# Patient Record
Sex: FEMALE | Race: WHITE | NOT HISPANIC OR LATINO | Employment: OTHER | URBAN - METROPOLITAN AREA
[De-identification: names, ages, dates, MRNs, and addresses within clinical notes are randomized per-mention and may not be internally consistent; named-entity substitution may affect disease eponyms.]

---

## 2017-09-20 ENCOUNTER — OFFICE VISIT (OUTPATIENT)
Dept: RETAIL CLINIC | Facility: CLINIC | Age: 47
End: 2017-09-20

## 2017-09-20 ENCOUNTER — RESULTS ENCOUNTER (OUTPATIENT)
Dept: RETAIL CLINIC | Facility: CLINIC | Age: 47
End: 2017-09-20

## 2017-09-20 VITALS
TEMPERATURE: 98.3 F | RESPIRATION RATE: 16 BRPM | BODY MASS INDEX: 26.22 KG/M2 | SYSTOLIC BLOOD PRESSURE: 140 MMHG | HEIGHT: 63 IN | OXYGEN SATURATION: 96 % | HEART RATE: 95 BPM | WEIGHT: 148 LBS | DIASTOLIC BLOOD PRESSURE: 88 MMHG

## 2017-09-20 DIAGNOSIS — N39.0 URINARY TRACT INFECTION, SITE UNSPECIFIED: Primary | ICD-10-CM

## 2017-09-20 DIAGNOSIS — N39.0 URINARY TRACT INFECTION, SITE UNSPECIFIED: ICD-10-CM

## 2017-09-20 PROCEDURE — 99202 OFFICE O/P NEW SF 15 MIN: CPT | Performed by: NURSE PRACTITIONER

## 2017-09-20 RX ORDER — NITROFURANTOIN 25; 75 MG/1; MG/1
100 CAPSULE ORAL 2 TIMES DAILY
Qty: 14 CAPSULE | Refills: 0 | Status: SHIPPED | OUTPATIENT
Start: 2017-09-20 | End: 2019-04-15

## 2017-09-20 RX ORDER — LEVOTHYROXINE SODIUM 0.1 MG/1
100 TABLET ORAL DAILY
COMMUNITY
End: 2019-04-15

## 2017-09-20 NOTE — PATIENT INSTRUCTIONS
Urinary Tract Infection, Adult  A urinary tract infection (UTI) is an infection of any part of the urinary tract, which includes the kidneys, ureters, bladder, and urethra. These organs make, store, and get rid of urine in the body. UTI can be a bladder infection (cystitis) or kidney infection (pyelonephritis).  CAUSES  This infection may be caused by fungi, viruses, or bacteria. Bacteria are the most common cause of UTIs. This condition can also be caused by repeated incomplete emptying of the bladder during urination.   RISK FACTORS  This condition is more likely to develop if:   · You ignore your need to urinate or hold urine for long periods of time.  · You do not empty your bladder completely during urination.  · You wipe back to front after urinating or having a bowel movement, if you are female.  · You are uncircumcised, if you are male.    · You are constipated.  · You have a urinary catheter that stays in place (indwelling).  · You have a weak defense (immune) system.  · You have a medical condition that affects your bowels, kidneys, or bladder.  · You have diabetes.  · You take antibiotic medicines frequently or for long periods of time, and the antibiotics no longer work well against certain types of infections (antibiotic resistance).  · You take medicines that irritate your urinary tract.  · You are exposed to chemicals that irritate your urinary tract.  · You are female.  SYMPTOMS   Symptoms of this condition include:   · Fever.    · Frequent urination or passing small amounts of urine frequently.    · Needing to urinate urgently.    · Pain or burning with urination.    · Urine that smells bad or unusual.    · Cloudy urine.    · Pain in the lower abdomen or back.    · Trouble urinating.    · Blood in the urine.    · Vomiting or being less hungry than normal.     · Diarrhea or abdominal pain.    · Vaginal discharge, if you are female.  DIAGNOSIS  This condition is diagnosed with a medical history and  physical exam. You will also need to provide a urine sample to test your urine. Other tests may be done, including:    · Blood tests.    · Sexually transmitted disease (STD) testing.     If you have had more than one UTI, a cystoscopy or imaging studies may be done to determine the cause of the infections.   TREATMENT   Treatment for this condition often includes a combination of two or more of the following:   · Antibiotic medicine.    · Other medicines to treat less common causes of UTI.    · Over-the-counter medicines to treat pain.    · Drinking enough water to stay hydrated.  HOME CARE INSTRUCTIONS  · Take over-the-counter and prescription medicines only as told by your health care provider.  · If you were prescribed an antibiotic, take it as told by your health care provider. Do not stop taking the antibiotic even if you start to feel better.  · Avoid alcohol, caffeine, tea, and carbonated beverages. They can irritate your bladder.  · Drink enough fluid to keep your urine clear or pale yellow.  · Keep all follow-up visits as told by your health care provider. This is important.  · Make sure to:    Empty your bladder often and completely. Do not hold urine for long periods of time.    Empty your bladder before and after sex.    Wipe from front to back after a bowel movement if you are female. Use each tissue one time when you wipe.  SEEK MEDICAL CARE IF:   · You have back pain.    · You have a fever.  · You feel nauseous or vomit.    · Your symptoms do not get better after 3 days.     · Your symptoms go away and then return.  SEEK IMMEDIATE MEDICAL CARE IF:   · You have severe back pain or lower abdominal pain.    · You are vomiting and cannot keep down any medicines or water.     This information is not intended to replace advice given to you by your health care provider. Make sure you discuss any questions you have with your health care provider.     Document Released: 09/27/2006 Document Revised: 04/10/2017  Document Reviewed: 11/07/2016  [employer] Interactive Patient Education ©2017 [employer]    Drink plenty of decaffeinated fluids  Continue pyridium x 48 hours  Take antibiotic as prescribed, even if you begin to feel better  See PCP if no improvement in 48 hours, you get worse or you develop new symptoms  Report adverse side effects to new medications

## 2017-09-20 NOTE — PROGRESS NOTES
Subjective   Urinary Tract Infection    Cassia Ochoa is a 47 y.o. female who complains of urinary frequency, urgency and dysuria x 7 days. She has been taking pyridium and Aleve with minimal relief.      Urinary Tract Infection    This is a new problem. The current episode started in the past 7 days. The problem occurs every urination. The problem has been gradually worsening. The quality of the pain is described as stabbing. The pain is at a severity of 6/10. There has been no fever. She is sexually active. There is no history of pyelonephritis. Associated symptoms include frequency, nausea and urgency. Pertinent negatives include no flank pain or hematuria. She has tried home medications and NSAIDs for the symptoms. The treatment provided mild relief.        History Obtained from: Patient    Past Medical History:   Diagnosis Date   • Hashimoto's thyroiditis    • Urinary tract infection      Past Surgical History:   Procedure Laterality Date   • HYSTERECTOMY       Social History     Social History   • Marital status: Single     Spouse name: N/A   • Number of children: N/A   • Years of education: N/A     Occupational History   • Not on file.     Social History Main Topics   • Smoking status: Former Smoker     Quit date: 2016   • Smokeless tobacco: Never Used   • Alcohol use Yes      Comment: rare   • Drug use: No   • Sexual activity: Defer     Other Topics Concern   • Not on file     Social History Narrative   • No narrative on file     History reviewed. No pertinent family history.  Allergies   Allergen Reactions   • Sulfa Antibiotics Anaphylaxis   • Codeine      Current Outpatient Prescriptions   Medication Sig Dispense Refill   • ESTRADIOL PO Take  by mouth.     • levothyroxine (SYNTHROID, LEVOTHROID) 100 MCG tablet Take 100 mcg by mouth Daily.     • nitrofurantoin, macrocrystal-monohydrate, (MACROBID) 100 MG capsule Take 1 capsule by mouth 2 (Two) Times a Day. 14 capsule 0     No current  "facility-administered medications for this visit.         Review of Systems   Gastrointestinal: Positive for nausea.   Genitourinary: Positive for frequency and urgency. Negative for flank pain and hematuria.       Objective     VITAL SIGNS:   Vitals:    09/20/17 1427   BP: 140/88   Pulse: 95   Resp: 16   Temp: 98.3 °F (36.8 °C)   TempSrc: Temporal Artery    SpO2: 96%   Weight: 148 lb (67.1 kg)   Height: 63\" (160 cm)   PainSc:   6   Body mass index is 26.22 kg/(m^2).    Physical Exam   Constitutional: She appears well-developed and well-nourished.   HENT:   Head: Normocephalic and atraumatic.   Right Ear: External ear normal.   Left Ear: External ear normal.   Mouth/Throat: Mucous membranes are normal.   Eyes: Lids are normal. Pupils are equal, round, and reactive to light. No scleral icterus.   Neck: Phonation normal. Neck supple.   Cardiovascular: Normal rate and regular rhythm.    Pulmonary/Chest: Effort normal and breath sounds normal.   Abdominal: Soft. Bowel sounds are normal. There is no hepatosplenomegaly. There is tenderness in the suprapubic area. There is no CVA tenderness.   Musculoskeletal: She exhibits no edema or deformity.   Lymphadenopathy:     She has no cervical adenopathy.        Right: No supraclavicular adenopathy present.        Left: No supraclavicular adenopathy present.   Neurological: She is alert. She has normal strength. She is not disoriented.   Skin: Skin is warm and dry. No rash noted. No cyanosis.   Psychiatric: She has a normal mood and affect. She is attentive.       LABS: No results found for this or any previous visit.    CLINICAL QUALITY MEASURES:  Tobacco Screening & Intervention Screened & identified as tobacco non-user. Former smoker   WEIGHT SCREENING/BMI  Not eligible, overweight & managed by other physician     Assessment/Plan   Frequency of urination  -     Cancel: Urine culture (clean catch); Future  -     Urine culture (clean catch); Future    Other orders  -     " nitrofurantoin, macrocrystal-monohydrate, (MACROBID) 100 MG capsule; Take 1 capsule by mouth 2 (Two) Times a Day.      PLAN:    Defer dipstick UA due to pyridium use  Await culture results  Patient should follow up with primary care provider (list provided to patient) if symptoms worsen, fail to resolve or other symptoms need attention.      The patient voiced understanding and agreement to the patient treatment plan and instructions     JEAN Bello

## 2017-09-28 ENCOUNTER — TELEPHONE (OUTPATIENT)
Dept: RETAIL CLINIC | Facility: CLINIC | Age: 47
End: 2017-09-28

## 2017-09-28 NOTE — TELEPHONE ENCOUNTER
Phoned pt regarding urinalysis culture results she has inquired about.  Informed pt that the specimen had not been picked up.  Return to clinic or primary care provider to follow up if symptoms return.  Pt verbalized understanding.

## 2019-04-15 ENCOUNTER — OFFICE VISIT (OUTPATIENT)
Dept: INTERNAL MEDICINE | Facility: CLINIC | Age: 49
End: 2019-04-15

## 2019-04-15 VITALS
TEMPERATURE: 98.4 F | WEIGHT: 160 LBS | DIASTOLIC BLOOD PRESSURE: 78 MMHG | BODY MASS INDEX: 27.31 KG/M2 | OXYGEN SATURATION: 96 % | HEIGHT: 64 IN | HEART RATE: 69 BPM | SYSTOLIC BLOOD PRESSURE: 118 MMHG

## 2019-04-15 DIAGNOSIS — Z83.3 FAMILY HISTORY OF DIABETES MELLITUS: ICD-10-CM

## 2019-04-15 DIAGNOSIS — E06.3 HYPOTHYROIDISM DUE TO HASHIMOTO'S THYROIDITIS: Primary | ICD-10-CM

## 2019-04-15 DIAGNOSIS — M25.50 ARTHRALGIA, UNSPECIFIED JOINT: ICD-10-CM

## 2019-04-15 DIAGNOSIS — E03.8 HYPOTHYROIDISM DUE TO HASHIMOTO'S THYROIDITIS: Primary | ICD-10-CM

## 2019-04-15 LAB
HBA1C MFR BLD: 5.2 %
T4 FREE SERPL-MCNC: 0.29 NG/DL (ref 0.93–1.7)
TSH SERPL DL<=0.05 MIU/L-ACNC: >100 MIU/ML (ref 0.27–4.2)

## 2019-04-15 PROCEDURE — 83036 HEMOGLOBIN GLYCOSYLATED A1C: CPT | Performed by: NURSE PRACTITIONER

## 2019-04-15 PROCEDURE — 84439 ASSAY OF FREE THYROXINE: CPT | Performed by: NURSE PRACTITIONER

## 2019-04-15 PROCEDURE — 84443 ASSAY THYROID STIM HORMONE: CPT | Performed by: NURSE PRACTITIONER

## 2019-04-15 PROCEDURE — 99203 OFFICE O/P NEW LOW 30 MIN: CPT | Performed by: NURSE PRACTITIONER

## 2019-04-15 RX ORDER — FUROSEMIDE 20 MG/1
20 TABLET ORAL 2 TIMES DAILY
COMMUNITY
End: 2020-07-10 | Stop reason: SDUPTHER

## 2019-04-15 NOTE — PATIENT INSTRUCTIONS
"Gluten-Free Diet for Celiac Disease, Adult  The gluten-free diet includes all foods that do not contain gluten. Gluten is a protein that is found in wheat, rye, barley, and some other grains. Following the gluten-free diet is the only treatment for people with celiac disease. It helps to prevent damage to the intestines and improves or eliminates the symptoms of celiac disease.  Following the gluten-free diet requires some planning. It can be challenging at first, but it gets easier with time and practice. There are more gluten-free options available today than ever before. If you need help finding gluten-free foods or if you have questions, talk with your diet and nutrition specialist (registered dietitian) or your health care provider.  What do I need to know about a gluten-free diet?  · All fruits, vegetables, and meats are safe to eat and do not contain gluten.  · When grocery shopping, start by shopping in the produce, meat, and dairy sections. These sections are more likely to contain gluten-free foods. Then move to the aisles that contain packaged foods if you need to.  · Read all food labels. Gluten is often added to foods. Always check the ingredient list and look for warnings, such as “may contain gluten.\"  · Talk with your dietitian or health care provider before taking a gluten-free multivitamin or mineral supplement.  · Be aware of gluten-free foods having contact with foods that contain gluten (cross-contamination). This can happen at home and with any processed foods.  ? Talk with your health care provider or dietitian about how to reduce the risk of cross-contamination in your home.  ? If you have questions about how a food is processed, ask the .  What key words help to identify gluten?  Foods that list any of these key words on the label usually contain gluten:  · Wheat, flour, enriched flour, bromated flour, white flour, durum flour, anabel flour, phosphated flour, self-rising flour, " semolina, farina, barley (malt), rye, and oats.  · Starch, dextrin, modified food starch, or cereal.  · Thickening, fillers, or emulsifiers.  · Malt flavoring, malt extract, or malt syrup.  · Hydrolyzed vegetable protein.    In the [place] packaged foods that are gluten-free are required to be labeled “GF.” These foods should be easy to identify and are safe to eat. In the [place] food companies are also required to list common food allergens, including wheat, on their labels.  Recommended foods  Grains  · Amaranth, bean flours, 100% buckwheat flour, corn, millet, nut flours or nut meals, GF oats, quinoa, rice, sorghum, teff, rice wafers, pure cornmeal tortillas, popcorn, and hot cereals made from cornmeal. Berkley, rice, wild rice. Some Asian rice noodles or bean noodles. Arrowroot starch, corn bran, corn flour, corn germ, cornmeal, corn starch, potato flour, potato starch flour, and rice bran. Plain, brown, and sweet rice flours. Rice polish, soy flour, and tapioca starch.  Vegetables  · All plain fresh, frozen, and canned vegetables.  Fruits  · All plain fresh, frozen, canned, and dried fruits, and 100% fruit juices.  Meats and other protein foods  · All fresh beef, pork, poultry, fish, seafood, and eggs. Fish canned in water, oil, brine, or vegetable broth. Plain nuts and seeds, peanut butter. Some lunch meat and some frankfurters. Dried beans, dried peas, and lentils.  Dairy  · Fresh plain, dry, evaporated, or condensed milk. Cream, butter, sour cream, whipping cream, and most yogurts. Unprocessed cheese, most processed cheeses, some cottage cheese, some cream cheeses.  Beverages  · Coffee, tea, most herbal teas. Carbonated beverages and some root beers. Wine, sake, and distilled spirits, such as gin, vodka, and whiskey. Most hard ciders.  Fats and oils  · Butter, margarine, vegetable oil, hydrogenated butter, olive oil, shortening, lard, cream, and some mayonnaise. Some commercial salad dressings. Olives.  Sweets  and desserts  · Sugar, honey, some syrups, molasses, jelly, and jam. Plain hard candy, marshmallows, and gumdrops. Pure cocoa powder. Plain chocolate. Custard and some pudding mixes. Gelatin desserts, sorbets, frozen ice pops, and sherbet. Cake, cookies, and other desserts prepared with allowed flours. Some commercial ice creams. Cornstarch, tapioca, and rice puddings.  Seasoning and other foods  · Some canned or frozen soups. Monosodium glutamate (MSG). Cider, rice, and wine vinegar. Baking soda and baking powder. Cream of tartar. Baking and nutritional yeast. Certain soy sauces made without wheat (ask your dietitian about specific brands that are allowed). Nuts, coconut, and chocolate. Salt, pepper, herbs, spices, flavoring extracts, imitation or artificial flavorings, natural flavorings, and food colorings. Some medicines and supplements. Some lip glosses and other cosmetics. Rice syrups.  The items listed may not be a complete list. Talk with your dietitian about what dietary choices are best for you.  Foods to avoid  Grains  · Barley, bran, bulgur, couscous, cracked wheat, Perez, farro, anabel, malt, matzo, semolina, wheat germ, and all wheat and rye cereals including spelt and kamut. Cereals containing malt as a flavoring, such as rice cereal. Noodles, spaghetti, macaroni, most packaged rice mixes, and all mixes containing wheat, rye, barley, or triticale.  Vegetables  · Most creamed vegetables and most vegetables canned in sauces. Some commercially prepared vegetables and salads.  Fruits  · Thickened or prepared fruits and some pie fillings. Some fruit snacks and fruit roll-ups.  Meats and other protein foods  · Any meat or meat alternative containing wheat, rye, barley, or gluten stabilizers. These are often marinated or packaged meats and lunch meats. Bread-containing products, such as Swiss steak, croquettes, meatballs, and meatloaf. Most tuna canned in vegetable broth and turkey with hydrolyzed vegetable  protein (HVP) injected as part of the basting. Seitan. Imitation fish. Eggs in sauces made from ingredients to avoid.  Dairy  · Commercial chocolate milk drinks and malted milk. Some non-dairy creamers. Any cheese product containing ingredients to avoid.  Beverages  · Certain cereal beverages. Beer, marion, malted milk, and some root beers. Some hard ciders. Some instant flavored coffees. Some herbal teas made with barley or with barley malt added.  Fats and oils  · Some commercial salad dressings. Sour cream containing modified food starch.  Sweets and desserts  · Some toffees. Chocolate-coated nuts (may be rolled in wheat flour) and some commercial candies and candy bars. Most cakes, cookies, donuts, pastries, and other baked goods. Some commercial ice cream. Ice cream cones. Commercially prepared mixes for cakes, cookies, and other desserts. Bread pudding and other puddings thickened with flour. Products containing brown rice syrup made with barley malt enzyme. Desserts and sweets made with malt flavoring.  Seasoning and other foods  · Some sumner powders, some dry seasoning mixes, some gravy extracts, some meat sauces, some ketchups, some prepared mustards, and horseradish. Certain soy sauces. Malt vinegar. Bouillon and bouillon cubes that contain HVP. Some chip dips, and some chewing gum. Yeast extract. Meyer’s yeast. Caramel color. Some medicines and supplements. Some lip glosses and other cosmetics.  The items listed may not be a complete list. Talk with your dietitian about what dietary choices are best for you.  Summary  · Gluten is a protein that is found in wheat, rye, barley, and some other grains. The gluten-free diet includes all foods that do not contain gluten.  · If you need help finding gluten-free foods or if you have questions, talk with your diet and nutrition specialist (registered dietitian) or your health care provider.  · Read all food labels. Gluten is often added to foods. Always check the  "ingredient list and look for warnings, such as “may contain gluten.\"  This information is not intended to replace advice given to you by your health care provider. Make sure you discuss any questions you have with your health care provider.  Document Released: 12/18/2006 Document Revised: 10/02/2017 Document Reviewed: 10/02/2017  [employer] Interactive Patient Education © 2019 [employer]  Low-Gluten Eating Plan  Gluten is a protein that is found in wheat, barley, rye, and some other grains. Some people have a condition that makes them unable to digest gluten. For those people, eating just a small amount of gluten can damage their intestines.  This is not a gluten-free eating plan. This low-gluten eating plan is for people who feel better when they eat less gluten.  What do I need to know about this eating plan?  · You can eat anything that does not contain wheat or other grains that have gluten.  · You can eat anything that is labeled \"gluten-free.\"  · Make sure to read food labels.  · Eat a variety of foods so you get all of the nutrients that you need.  · Avoid processed foods and sauces because many of them contain wheat. To have more control over the ingredients in your meals, consider making food yourself instead of buying prepared foods.  What foods can I eat?  Grains  Rice. Bulgur. Quinoa. Corn. Buckwheat. Amaranth. Corn tortillas or taco shells. Oatmeal that is labeled as “gluten free” or “uncontaminated.\"  Vegetables  Lettuce. Spinach. Peas. Beets. Cauliflower. Cabbage. Broccoli. Carrots. Tomatoes. Squash. Eggplant. Herbs. Peppers. Onions. Cucumbers. Shiro sprouts. Yams and sweet potatoes. Beans. Lentils.  Fruits  Bananas. Apples. Oranges. Grapes. Papaya. Camak. Pomegranate. Kiwi. Grapefruit. Cherries.  Meats and Other Protein Sources  Beef. Pork. Chicken. Turkey. Fish. Eggs. Tofu. Beans. Nuts. Lentils.  Dairy  Milk. Ice cream. Yogurt. Cheese. Cottage cheese.  Beverages  Water. Coffee. Tea. Juice. Soda. " Sawyer water.  Condiments  Mustard. Relish. Low-fat, low-sugar ketchup. Low-fat, low-sugar barbecue sauce. Vinegar. Low-fat or fat-free mayonnaise.  Sweets and Desserts  Honey. Sugar. Maple syrup.  Fats and Oils  Butter. Vegetable oil. Olive oil. Canola oil. Barrington oil.  Other  Arrowroot or cornstarch. Potato flour.  The items listed above may not be a complete list of recommended foods or beverages. Contact your dietitian for more options.  What foods are not recommended?  Grains  Wheat. Barley. Rye. Oatmeal.  Meat and Other Protein Sources  Seitan. Cold cuts. Hotdogs. Salami. Sausages.  Beverages  Beer.  Condiments  Malt vinegar. Salad dressing. Soy sauce.  Sweets and Desserts  Licorice. Brown rice syrup. Pre-made pudding or pudding mixes.  Other  Bouillon cubes. Canned or boxed pre-made soups or soup packets. Bagged chips, such as potato chips and tortilla chips. Seasoning packets.  The items listed above may not be a complete list of foods and beverages to avoid. Contact your dietitian for more information.  This information is not intended to replace advice given to you by your health care provider. Make sure you discuss any questions you have with your health care provider.  Document Released: 05/03/2016 Document Revised: 05/25/2017 Document Reviewed: 01/13/2016  [employer] Interactive Patient Education © 2019 [employer]

## 2019-04-15 NOTE — PROGRESS NOTES
Chief Complaint   Patient presents with   • Establish Care   • Thyroid Problem       History of Present Illness  48 y.o.female presents for [facility] new patient, follow-up thyroid disorder.  Patient with history of Hashimoto's disease but has been off her medicine since October as she lost her health insurance.  She is interested in resuming treatment but she still does not have health insurance so requests testing be limited at this time.  Has complaints of fatigue, weight gain.  Her hair had been falling out but this improved after she quit the taking the Synthroid.    Other than some past history of thyroid disease she does have some complaints of joint pain bilateral knee area; asking if this is related to her autoimmune disease.  Onset several months but worse over the last few weeks.  Her joint pain interferes with her daily activities.  She notices swelling at the joints intermittent and takes occasional Lasix.    Has complaints of excessive thirst and concerned that she may have diabetes as has a strong family history.  She is wanting diabetes testing today.    Has also noticed some possible reaction to bread and crackers with concern for gluten intolerance.  She develops abdominal pain bloating and belching with wheat products.  Has not had testing.      Review of Systems   Constitutional: Positive for fatigue and unexpected weight gain. Negative for appetite change, chills, diaphoresis and fever.   HENT: Negative.    Eyes: Negative for visual disturbance.   Respiratory: Negative for shortness of breath.    Cardiovascular: Positive for leg swelling. Negative for chest pain and palpitations.   Gastrointestinal: Positive for abdominal distention, abdominal pain and indigestion. Negative for blood in stool, constipation, diarrhea, nausea and vomiting.   Endocrine: Positive for heat intolerance and polydipsia. Negative for polyphagia and polyuria.   Genitourinary: Negative for difficulty urinating.  "  Musculoskeletal: Positive for arthralgias and joint swelling.   Skin: Negative for rash.   Psychiatric/Behavioral: Negative for depressed mood. The patient is nervous/anxious.          [facility]  The following portions of the patient's history were reviewed and updated as appropriate: allergies, current medications, past family history, past medical history, past social history, past surgical history and problem list.     Social hx:  Tobacco quit smoking 3 years ago  Alcohol maybe 1 drink per week.  Past Medical History:   Diagnosis Date   • Bilateral ovarian cysts    • Hashimoto's thyroiditis    • Hypothyroidism    • Urinary tract infection       Past Surgical History:   Procedure Laterality Date   • HYSTERECTOMY        Allergies   Allergen Reactions   • Sulfa Antibiotics Anaphylaxis   • Codeine       Family History   Problem Relation Age of Onset   • Hypertension Father    • Cancer Brother    • Thyroid disease Daughter             Current Outpatient Medications:   •  ESTRADIOL PO, Take  by mouth., Disp: , Rfl:   •  furosemide (LASIX) 20 MG tablet, Take 20 mg by mouth 2 (Two) Times a Day., Disp: , Rfl:     VITALS:  /78   Pulse 69   Temp 98.4 °F (36.9 °C)   Ht 162.6 cm (64\")   Wt 72.6 kg (160 lb)   SpO2 96%   Breastfeeding? No   BMI 27.46 kg/m²     Physical Exam   Constitutional: She is oriented to person, place, and time. She appears well-developed and well-nourished. No distress.   HENT:   Head: Normocephalic.   Right Ear: External ear normal.   Left Ear: External ear normal.   Nose: Nose normal.   Mouth/Throat: Oropharynx is clear and moist.   Eyes: EOM are normal. Pupils are equal, round, and reactive to light.   Neck: Normal range of motion. Neck supple. No thyromegaly present.   Cardiovascular: Normal rate, regular rhythm, normal heart sounds and intact distal pulses.   Pulmonary/Chest: Effort normal and breath sounds normal. No respiratory distress.   Abdominal: Soft. Bowel sounds are normal. There " is no tenderness.   Musculoskeletal:   Normal ROM all major joints; mild swelling bilateral knees.   Lymphadenopathy:     She has no cervical adenopathy.   Neurological: She is alert and oriented to person, place, and time.   Skin: Skin is warm and dry. Capillary refill takes less than 2 seconds. No rash noted.   Psychiatric: She has a normal mood and affect. Her behavior is normal.       LABS  Results for orders placed or performed in visit on 04/15/19   TSH   Result Value Ref Range    TSH >100.000 (H) 0.270 - 4.200 mIU/mL   T4, Free   Result Value Ref Range    Free T4 0.29 (L) 0.93 - 1.70 ng/dL   POC Glycosylated Hemoglobin (Hb A1C)   Result Value Ref Range    Hemoglobin A1C 5.2 %       ASSESSMENT/PLAN  Cassia was seen today for establish care and thyroid problem.    Diagnoses and all orders for this visit:    Hypothyroidism due to Hashimoto's thyroiditis  -     TSH  -     T4, Free  Discussed synthroid vs NP thyroid.  Pt did not like the alopecia that occurred with synthroid; she would like to try NP thyroid.  I provided pt with samples NP thyroid 30mg tab once daily then FU in 6 weeks.  Educated to take med in am and avoid taking with any other medications 4 hrs before and 4 hrs after.    Family history of diabetes mellitus  -     POC Glycosylated Hemoglobin (Hb A1C)    Arthralgia, unspecified joint  Comments:  pt declines joint pain work up labs    Patient denies further lab workup at this time other than diabetes screening and for her thyroid.  She does not have insurance she cannot afford further testing.  She did want me to give her a list of labs that I would recommend for her for further workup and she will check into cost and possibility of some financing.  I did explain to her that often times with one autoimmune process there are other autoimmune processes that can show up.  In regards to her bilateral knee pain joint swelling etc. would do workup to include rheumatoid arthritis panel JUNE and sed  rate.  She also at some point needs to get an updated CMP and CBC.  I will try to contact her prior primary care provider to see what kind of workup labs those available there.    I discussed the patients findings and my recommendations with patient.  Patient was encouraged to keep me informed of any acute changes, lack of improvement, or any new concerning symptoms.    Patient voiced understanding of all instructions and denied further questions.      FOLLOW-UP  Return in about 6 weeks (around 5/27/2019), or if symptoms worsen or fail to improve, for Recheck thyroid.    Electronically signed by:    JEAN Castillo  04/15/2019

## 2019-05-01 ENCOUNTER — TELEPHONE (OUTPATIENT)
Dept: INTERNAL MEDICINE | Facility: CLINIC | Age: 49
End: 2019-05-01

## 2019-05-01 NOTE — TELEPHONE ENCOUNTER
Faxed request for records with [facility], from [facility], [place], per Nasrin note.

## 2019-05-26 ENCOUNTER — OFFICE VISIT (OUTPATIENT)
Dept: INTERNAL MEDICINE | Facility: CLINIC | Age: 49
End: 2019-05-26

## 2019-05-26 VITALS
WEIGHT: 155 LBS | BODY MASS INDEX: 26.46 KG/M2 | OXYGEN SATURATION: 95 % | HEART RATE: 68 BPM | HEIGHT: 64 IN | TEMPERATURE: 98 F | SYSTOLIC BLOOD PRESSURE: 114 MMHG | DIASTOLIC BLOOD PRESSURE: 66 MMHG

## 2019-05-26 DIAGNOSIS — E03.9 HYPOTHYROIDISM, UNSPECIFIED TYPE: Primary | ICD-10-CM

## 2019-05-26 PROCEDURE — 99213 OFFICE O/P EST LOW 20 MIN: CPT | Performed by: NURSE PRACTITIONER

## 2019-05-26 PROCEDURE — 84443 ASSAY THYROID STIM HORMONE: CPT | Performed by: NURSE PRACTITIONER

## 2019-05-26 RX ORDER — LEVOTHYROXINE AND LIOTHYRONINE 19; 4.5 UG/1; UG/1
30 TABLET ORAL DAILY
COMMUNITY
End: 2019-05-27 | Stop reason: DRUGHIGH

## 2019-05-26 NOTE — PROGRESS NOTES
"Chief Complaint   Patient presents with   • Hypothyroidism     Follow up       History of Present Illness  48 y.o.female presents for hypothyroid follow up.  Has been taking np thyroid 30mg regularly.  Here for lab check feeling better. Still some hair loss but not as back as when took synthroid.    Review of Systems   Constitutional: Negative for chills, fatigue and fever.   Endocrine: Negative.        [facility]  The following portions of the patient's history were reviewed and updated as appropriate: allergies, current medications, past family history, past medical history, past social history, past surgical history and problem list.       Past Medical History:   Diagnosis Date   • Bilateral ovarian cysts    • Hashimoto's thyroiditis    • Hypothyroidism    • Urinary tract infection       Past Surgical History:   Procedure Laterality Date   • HYSTERECTOMY        Allergies   Allergen Reactions   • Sulfa Antibiotics Anaphylaxis   • Codeine Other (See Comments)     Pt states crazy dreams      Family History   Problem Relation Age of Onset   • Hypertension Father    • Cancer Brother    • Thyroid disease Daughter             Current Outpatient Medications:   •  ESTRADIOL PO, Take  by mouth., Disp: , Rfl:   •  furosemide (LASIX) 20 MG tablet, Take 20 mg by mouth 2 (Two) Times a Day., Disp: , Rfl:   •  Thyroid 30 MG PO tablet, Take 30 mg by mouth Daily., Disp: , Rfl:     VITALS:  /66   Pulse 68   Temp 98 °F (36.7 °C)   Ht 162.6 cm (64\")   Wt 70.3 kg (155 lb)   SpO2 95%   Breastfeeding? No   BMI 26.61 kg/m²     Physical Exam   Constitutional: She appears well-developed and well-nourished. No distress.   HENT:   Head: Normocephalic.   Neck: Neck supple. No thyromegaly present.   Pulmonary/Chest: Effort normal.   Neurological: She is alert.   Skin: Skin is warm and dry.   Psychiatric: She has a normal mood and affect.       LABS  Results for orders placed or performed in visit on 05/26/19   TSH   Result Value Ref " Range    TSH 51.800 (H) 0.270 - 4.200 mIU/mL       ASSESSMENT/PLAN  Cassia was seen today for hypothyroidism.    Diagnoses and all orders for this visit:    Hypothyroidism, unspecified type  -     TSH  -     Thyroid 60 MG PO tablet; Take 1 tablet by mouth Daily.    need lab recheck tsh in 6 weeks.    I discussed the patients findings and my recommendations with patient.  Patient was encouraged to keep me informed of any acute changes, lack of improvement, or any new concerning symptoms.    Patient voiced understanding of all instructions and denied further questions.      FOLLOW-UP  Return if symptoms worsen or fail to improve, for Recheck tsh in 6 weeks..    Electronically signed by:    JEAN Castillo  05/26/2019

## 2019-05-27 ENCOUNTER — TELEPHONE (OUTPATIENT)
Dept: INTERNAL MEDICINE | Facility: CLINIC | Age: 49
End: 2019-05-27

## 2019-05-27 LAB — TSH SERPL DL<=0.05 MIU/L-ACNC: 51.8 MIU/ML (ref 0.27–4.2)

## 2019-05-27 RX ORDER — LEVOTHYROXINE AND LIOTHYRONINE 38; 9 UG/1; UG/1
60 TABLET ORAL DAILY
Qty: 42 TABLET | Refills: 0 | COMMUNITY
Start: 2019-05-27 | End: 2019-07-09

## 2019-05-27 NOTE — TELEPHONE ENCOUNTER
Let pt know of message. Pt verbalized understanding.        ----- Message from JEAN Breaux sent at 5/27/2019  1:32 PM EDT -----  Let pt know her new thyroid dose sample is at  for her to . NP thyroid 60mg once daily.  She will need a lab draw in 6 weeks.

## 2019-06-20 DIAGNOSIS — E03.9 HYPOTHYROIDISM, UNSPECIFIED TYPE: ICD-10-CM

## 2019-06-20 RX ORDER — LEVOTHYROXINE AND LIOTHYRONINE 38; 9 UG/1; UG/1
60 TABLET ORAL DAILY
Qty: 42 TABLET | Refills: 0 | COMMUNITY
Start: 2019-06-20

## 2019-06-20 NOTE — TELEPHONE ENCOUNTER
PATIENT IS REQUESTING REFILL, SHE STATES THAT HER DOG ATE THE SAMPLE BOTTLE GIVEN TO HER DURING HER RECENT VISIT AND NOW SHE DOES NOT HAVE ANY MEDICATION TO TAKE.

## 2019-07-01 ENCOUNTER — OFFICE VISIT (OUTPATIENT)
Dept: INTERNAL MEDICINE | Facility: CLINIC | Age: 49
End: 2019-07-01

## 2019-07-01 VITALS
SYSTOLIC BLOOD PRESSURE: 120 MMHG | DIASTOLIC BLOOD PRESSURE: 60 MMHG | WEIGHT: 156 LBS | HEART RATE: 80 BPM | HEIGHT: 64 IN | BODY MASS INDEX: 26.63 KG/M2 | OXYGEN SATURATION: 100 % | TEMPERATURE: 98 F

## 2019-07-01 DIAGNOSIS — Z11.3 ROUTINE SCREENING FOR STI (SEXUALLY TRANSMITTED INFECTION): Primary | ICD-10-CM

## 2019-07-01 LAB
HAV IGM SERPL QL IA: NORMAL
HBV CORE IGM SERPL QL IA: NORMAL
HBV SURFACE AG SERPL QL IA: NORMAL
HCV AB SER DONR QL: NORMAL

## 2019-07-01 PROCEDURE — 87522 HEPATITIS C REVRS TRNSCRPJ: CPT | Performed by: NURSE PRACTITIONER

## 2019-07-01 PROCEDURE — 87591 N.GONORRHOEAE DNA AMP PROB: CPT | Performed by: NURSE PRACTITIONER

## 2019-07-01 PROCEDURE — 80074 ACUTE HEPATITIS PANEL: CPT | Performed by: NURSE PRACTITIONER

## 2019-07-01 PROCEDURE — 86592 SYPHILIS TEST NON-TREP QUAL: CPT | Performed by: NURSE PRACTITIONER

## 2019-07-01 PROCEDURE — 86702 HIV-2 ANTIBODY: CPT | Performed by: NURSE PRACTITIONER

## 2019-07-01 PROCEDURE — 86695 HERPES SIMPLEX TYPE 1 TEST: CPT | Performed by: NURSE PRACTITIONER

## 2019-07-01 PROCEDURE — 86696 HERPES SIMPLEX TYPE 2 TEST: CPT | Performed by: NURSE PRACTITIONER

## 2019-07-01 PROCEDURE — 99213 OFFICE O/P EST LOW 20 MIN: CPT | Performed by: NURSE PRACTITIONER

## 2019-07-01 PROCEDURE — 87491 CHLMYD TRACH DNA AMP PROBE: CPT | Performed by: NURSE PRACTITIONER

## 2019-07-01 NOTE — PROGRESS NOTES
Chief Complaint   Patient presents with   • Exposure to STD       History of Present Illness    Cassia Ochoa is a 48 y.o. female who presents today for sores in mouth x 11 weeks and concern for STD exposure. States she has had mouth sores since previous sexual encounter with partner with unknown STD history/exposure. She has tested positive for STDs in the past including chlamydia, PID, and HPV. Denies mutiple partners in the last 3 months, has had 1 partner. Denies fevers, chills, N/V/D, pelvic pain/discomfort, rash, vaginal pain/burning/discharge, genital lesions, dysuria, hematuria. Endorses 3 mouth lesions, 1 she thinks she may have had since years prior when she used to smoke. Describes white patches at location of tonsillar region. Denies blisters or open ulcerations in mouth. Has not had dentistry evaluation of lesions.       Psychiatric    The following portions of the patient's history were reviewed and updated as appropriate: allergies, current medications, past family history, past medical history, past social history, past surgical history and problem list.     Past Medical History:   Diagnosis Date   • Bilateral ovarian cysts    • Hashimoto's thyroiditis    • Hypothyroidism    • Urinary tract infection        Past Surgical History:   Procedure Laterality Date   • HYSTERECTOMY         Allergies   Allergen Reactions   • Sulfa Antibiotics Anaphylaxis   • Codeine Other (See Comments)     Pt states crazy dreams         Current Outpatient Medications:   •  ESTRADIOL PO, Take  by mouth., Disp: , Rfl:   •  furosemide (LASIX) 20 MG tablet, Take 20 mg by mouth 2 (Two) Times a Day., Disp: , Rfl:   •  Thyroid 60 MG PO tablet, Take 1 tablet by mouth Daily., Disp: 42 tablet, Rfl: 0  •  B COMPLEX VITAMINS ER PO, Take 1 capsule by mouth., Disp: , Rfl:     Review of Systems  Review of Systems   Constitutional: Negative for chills and fever.   Respiratory: Negative for shortness of breath.    Cardiovascular: Negative for  "chest pain.   Genitourinary: Negative for difficulty urinating, dysuria, flank pain, frequency, genital sores, hematuria, pelvic pain, urgency, vaginal bleeding, vaginal discharge and vaginal pain.   Skin: Negative for color change, rash and wound.   Psychiatric/Behavioral: Negative for sleep disturbance. The patient is not nervous/anxious and is not hyperactive.        Vitals:  Vitals:    07/01/19 1417   BP: 120/60   Pulse: 80   Temp: 98 °F (36.7 °C)   SpO2: 100%   Weight: 70.8 kg (156 lb)   Height: 162.6 cm (64\")   PainSc: 0-No pain       Physical Exam  Physical Exam   Constitutional: She is oriented to person, place, and time. Vital signs are normal. She appears well-developed and well-nourished.   HENT:   Mouth/Throat: Uvula is midline, oropharynx is clear and moist and mucous membranes are normal.       Small indentation on soft pallet near area of concern for patient. There are no open lesions, ulcerations, or obvious abnormality of teeth, mouth, gums, or otherwise on physical examination. Tonsils are absent with no obvious \"white patches\" in region as patient described. Appears there may be minimal scar tissue in region.    Cardiovascular: Normal rate, regular rhythm and normal heart sounds.   Pulmonary/Chest: Effort normal and breath sounds normal.   Neurological: She is alert and oriented to person, place, and time.   Skin: Skin is warm, dry and intact. No rash noted.   Psychiatric: She has a normal mood and affect. Her speech is normal and behavior is normal. Judgment and thought content normal. Cognition and memory are normal.       Labs  Results for orders placed or performed in visit on 07/01/19   Hepatitis panel, acute   Result Value Ref Range    Hepatitis B Surface Ag Non-Reactive Non-Reactive    Hep A IgM Non-Reactive Non-Reactive    Hep B C IgM Non-Reactive Non-Reactive    Hepatitis C Ab Non-Reactive Non-Reactive   RPR   Result Value Ref Range    RPR Non-Reactive Non-Reactive "       Assessment/Plan  Cassia was seen today for exposure to std.    Diagnoses and all orders for this visit:    Routine screening for STI (sexually transmitted infection)  -     Chlamydia trachomatis, Neisseria gonorrhoeae, PCR - Urine, Urine, Clean Catch  -     Hepatitis C RNA, quantitative, PCR (graph)  -     Hepatitis panel, acute  -     HIV 2 Antibody Screen w reflex  -     HSV 1 and 2 IgM, Abs, Indirect  -     HSV 1 and 2-Specific Ab, IgG  -     RPR    Encouraged patient to practice safe sex. Inform partner/s of any positive results of STIs. If positive, patient informed of need to abstain from intercourse during treatment and for 1-2 weeks after. Advised dentistry F/U for soft pallet/gum concern as no obvious lesions on physical exam.    Plan of care reviewed with patient at conclusion of today's visit. Patient education was provided regarding diagnosis, management, and prescribed or recommended OTC medications. Patient was informed to notify office of any new, worsening, or persistent symptoms. Patient verbalized understanding and agreement with plan of care.     Follow-Up  Return if symptoms worsen or fail to improve.    Electronically Signed By:  JEAN Hawkins

## 2019-07-02 LAB — RPR SER QL: NORMAL

## 2019-07-03 ENCOUNTER — TELEPHONE (OUTPATIENT)
Dept: INTERNAL MEDICINE | Facility: CLINIC | Age: 49
End: 2019-07-03

## 2019-07-03 LAB
HSV-2 IGG SUPPLEMENTAL TEST: POSITIVE
HSV1 IGG SER IA-ACNC: 54.8 INDEX (ref 0–0.9)
HSV2 IGG SER IA-ACNC: 4.93 INDEX (ref 0–0.9)

## 2019-07-03 NOTE — TELEPHONE ENCOUNTER
Called pt and she voiced understanding    ----- Message from JEAN Hawkins sent at 7/3/2019 11:17 AM EDT -----  Lab results negative for syphilis and Hepatitis at this time. Other results pending and will notify upon receipt.

## 2019-07-03 NOTE — PROGRESS NOTES
Lab results received and positive for HSV-1 (oral herpes) and HSV-2 (genital herpes). There is no cure for herpes, however there are medicines that can help with outbreaks. Patient should be informed to practice safe sex as herpes can be spread to unaffected partners even in the absence of symptoms. Patient should be sure to inform partners and abstain from intercourse during active breakouts.

## 2019-07-04 LAB
C TRACH RRNA SPEC DONR QL NAA+PROBE: NEGATIVE
HSV1 IGM TITR SER IF: NORMAL TITER
HSV2 IGM TITR SER IF: NORMAL TITER
N GONORRHOEA DNA SPEC QL NAA+PROBE: NEGATIVE

## 2019-07-04 NOTE — PROGRESS NOTES
STD results are negative for chlamydia and gonorrhoeae, please advise patient to practice safe sex and follow-up for new, worsening, or persistent symptoms.

## 2019-07-05 LAB
HCV RNA SERPL NAA+PROBE-ACNC: NORMAL IU/ML
TEST INFORMATION: NORMAL

## 2019-07-08 ENCOUNTER — OFFICE VISIT (OUTPATIENT)
Dept: INTERNAL MEDICINE | Facility: CLINIC | Age: 49
End: 2019-07-08

## 2019-07-08 ENCOUNTER — TELEPHONE (OUTPATIENT)
Dept: INTERNAL MEDICINE | Facility: CLINIC | Age: 49
End: 2019-07-08

## 2019-07-08 VITALS
HEART RATE: 71 BPM | DIASTOLIC BLOOD PRESSURE: 72 MMHG | BODY MASS INDEX: 26.29 KG/M2 | HEIGHT: 64 IN | WEIGHT: 154 LBS | SYSTOLIC BLOOD PRESSURE: 124 MMHG | OXYGEN SATURATION: 99 % | TEMPERATURE: 98 F

## 2019-07-08 DIAGNOSIS — E03.9 HYPOTHYROIDISM, UNSPECIFIED TYPE: Primary | ICD-10-CM

## 2019-07-08 DIAGNOSIS — Z79.890 ENCOUNTER FOR MONITORING ESTROGEN REPLACEMENT THERAPY FOLLOWING SURGICAL MENOPAUSE: ICD-10-CM

## 2019-07-08 DIAGNOSIS — Z51.81 ENCOUNTER FOR MONITORING ESTROGEN REPLACEMENT THERAPY FOLLOWING SURGICAL MENOPAUSE: ICD-10-CM

## 2019-07-08 LAB
HIV 2 AB SERPL QL IA: NEGATIVE
TSH SERPL DL<=0.05 MIU/L-ACNC: 15.1 MIU/ML (ref 0.27–4.2)

## 2019-07-08 PROCEDURE — 84439 ASSAY OF FREE THYROXINE: CPT | Performed by: NURSE PRACTITIONER

## 2019-07-08 PROCEDURE — 84443 ASSAY THYROID STIM HORMONE: CPT | Performed by: NURSE PRACTITIONER

## 2019-07-08 PROCEDURE — 99214 OFFICE O/P EST MOD 30 MIN: CPT | Performed by: NURSE PRACTITIONER

## 2019-07-08 RX ORDER — LEVOTHYROXINE AND LIOTHYRONINE 38; 9 UG/1; UG/1
60 TABLET ORAL DAILY
Qty: 42 TABLET | Refills: 0 | Status: CANCELLED | COMMUNITY
Start: 2019-07-08

## 2019-07-08 RX ORDER — ESTRADIOL 1 MG/1
1 TABLET ORAL DAILY
Qty: 30 TABLET | Refills: 5 | Status: SHIPPED | OUTPATIENT
Start: 2019-07-08 | End: 2020-01-29 | Stop reason: SDUPTHER

## 2019-07-08 NOTE — PROGRESS NOTES
Chief Complaint   Patient presents with   • Hypothyroidism   • Med Refill     estrogen hormone replacement therapy       History of Present Illness  48 y.o.female presents for hypothyroidism and estrogen replacement therapy.  History hashimotos thyroiditis with hypothyroidism onset over a year; taking NP thyroid 60mg daily; note increased dose from 30 to 60mg aprox 6 weeks ago and here for updated labs. Did take just 30mg last 2 days as out of med.  No reported side effects.  No fatigue, hot or cold flashes. No palpitations.  Feels much better on the np thyroid than the synthroid less side effects.     Hx hysterectomy age 29; taking estrogen replacement.  Doing ok; she tried decreasing to 1mg daily (since no insurance) and tolerated ok. Would like to cont at 1mg daily.  Need mammogram updated; but can't do right now with no insurance. Asking if there are any community resources for mammogram screenings.  No hx of dvt blood clots.  No chest pain or migraines.  No family hx of breast cancer.      Review of Systems   Constitutional: Negative for chills, fatigue and fever.   Eyes: Negative for blurred vision and visual disturbance.   Respiratory: Negative for shortness of breath.    Cardiovascular: Negative for chest pain, palpitations and leg swelling.   Endocrine: Negative for cold intolerance and heat intolerance.   Genitourinary: Negative for breast discharge, difficulty urinating, breast lump and breast pain.   Neurological: Negative for dizziness, light-headedness and headache.         [facility]  The following portions of the patient's history were reviewed and updated as appropriate: allergies, current medications, past family history, past medical history, past social history, past surgical history and problem list.     Social hx:  Tobacco former smoker  Alcohol  Past Medical History:   Diagnosis Date   • Bilateral ovarian cysts    • Hashimoto's thyroiditis    • Hypothyroidism    • Urinary tract infection       Past  "Surgical History:   Procedure Laterality Date   • HYSTERECTOMY        Allergies   Allergen Reactions   • Sulfa Antibiotics Anaphylaxis   • Codeine Other (See Comments)     Pt states crazy dreams      Family History   Problem Relation Age of Onset   • Hypertension Father    • Cancer Brother    • Thyroid disease Daughter             Current Outpatient Medications:   •  B COMPLEX VITAMINS ER PO, Take 1 capsule by mouth., Disp: , Rfl:   •  ESTRADIOL PO, Take  by mouth., Disp: , Rfl:   •  furosemide (LASIX) 20 MG tablet, Take 20 mg by mouth 2 (Two) Times a Day., Disp: , Rfl:   •  Thyroid 60 MG PO tablet, Take 1 tablet by mouth Daily., Disp: 42 tablet, Rfl: 0    VITALS:  /72   Pulse 71   Temp 98 °F (36.7 °C)   Ht 162.6 cm (64\")   Wt 69.9 kg (154 lb)   SpO2 99%   BMI 26.43 kg/m²     Physical Exam   Constitutional: She is oriented to person, place, and time. She appears well-developed and well-nourished. No distress.   HENT:   Head: Normocephalic.   Eyes: Pupils are equal, round, and reactive to light.   Neck: No thyromegaly present.   Cardiovascular: Normal rate, regular rhythm and normal heart sounds.   Pulmonary/Chest: Effort normal and breath sounds normal. No respiratory distress.   Neurological: She is alert and oriented to person, place, and time.   Skin: Skin is warm and dry.   Psychiatric: She has a normal mood and affect.       LABS  Results for orders placed or performed in visit on 07/08/19   TSH   Result Value Ref Range    TSH 15.100 (H) 0.270 - 4.200 mIU/mL   T4, Free   Result Value Ref Range    Free T4 0.78 (L) 0.93 - 1.70 ng/dL       ASSESSMENT/PLAN  Cassia was seen today for hypothyroidism and med refill.    Diagnoses and all orders for this visit:    Hypothyroidism, unspecified type  -     TSH  -     T4, Free  -     Thyroid 60 MG PO tablet; Take 1 tablet by mouth Daily.    She had a couple of missed doses; will leave at 60mg daily; FU in 6 months.    Encounter for monitoring estrogen " replacement therapy following surgical menopause  -     estradiol (ESTRACE) 1 MG tablet; Take 1 tablet by mouth Daily.  Recommend mammogram breast cancer screening but pt does not have insurance wants to wait 6 months.  I did contact  to see if any community resources available.    I discussed the patients findings and my recommendations with patient.  Patient was encouraged to keep me informed of any acute changes, lack of improvement, or any new concerning symptoms.    Patient voiced understanding of all instructions and denied further questions.      FOLLOW-UP  Return in about 6 months (around 1/8/2020), or if symptoms worsen or fail to improve.  FU thyroid  Electronically signed by:    JEAN Castillo  07/08/2019

## 2019-07-08 NOTE — TELEPHONE ENCOUNTER
----- Message from JEAN Hawkins sent at 7/4/2019 10:29 AM EDT -----  STD results are negative for chlamydia and gonorrhoeae, please advise patient to practice safe sex and follow-up for new, worsening, or persistent symptoms.

## 2019-07-09 LAB — T4 FREE SERPL-MCNC: 0.78 NG/DL (ref 0.93–1.7)

## 2019-07-09 RX ORDER — LEVOTHYROXINE AND LIOTHYRONINE 38; 9 UG/1; UG/1
60 TABLET ORAL DAILY
Qty: 30 TABLET | Refills: 5 | Status: SHIPPED | OUTPATIENT
Start: 2019-07-09 | End: 2019-12-30

## 2019-07-10 ENCOUNTER — TELEPHONE (OUTPATIENT)
Dept: INTERNAL MEDICINE | Facility: CLINIC | Age: 49
End: 2019-07-10

## 2019-07-10 NOTE — PROGRESS NOTES
Lab results received and negative for HIV. Encourage patient to practice safe sex and follow-up for new, worsening, or persistent symptoms.

## 2019-07-12 ENCOUNTER — TELEPHONE (OUTPATIENT)
Dept: INTERNAL MEDICINE | Facility: CLINIC | Age: 49
End: 2019-07-12

## 2019-07-12 NOTE — TELEPHONE ENCOUNTER
----- Message from JEAN Breaux sent at 7/12/2019 12:44 PM EDT -----  Call pt with results. Her thyroid labs better. Almost normal.  Will leave at 60mg day on the np thyroid. I sent RX.  I want to see her in 6 months.

## 2019-08-11 ENCOUNTER — DOCUMENTATION (OUTPATIENT)
Dept: INTERNAL MEDICINE | Facility: CLINIC | Age: 49
End: 2019-08-11

## 2019-08-11 DIAGNOSIS — Z12.39 BREAST CANCER SCREENING: ICD-10-CM

## 2019-08-11 DIAGNOSIS — N63.0 BREAST LUMP: Primary | ICD-10-CM

## 2019-08-11 NOTE — PROGRESS NOTES
Pt came by office upset was not able to get mammogram today;  had helped her get this set up for reduced cost as no insurance. Reported to me breast lump right breast located at 10oclock 1.5 in from nipple pea sized. I explained will need diagnostic mammogram.  She is upset as no insurance and cannot pay for this. I placed order, I will have  to try to help her with where might be able to get done for lower price.

## 2019-08-12 ENCOUNTER — TELEPHONE (OUTPATIENT)
Dept: INTERNAL MEDICINE | Facility: CLINIC | Age: 49
End: 2019-08-12

## 2019-08-12 NOTE — TELEPHONE ENCOUNTER
PATIENT IS CALLING TO LET YOU KNOW WHERE TO SENT THE ORDER FOR DIAGNOSTIC MAMMO WITH ULTRASOUND. PLEASE FAX TO: [facility]  [phone], IF THERE IS A PROBLEM, [facility] PHONE NUMBER [phone].

## 2019-12-30 DIAGNOSIS — E03.9 HYPOTHYROIDISM, UNSPECIFIED TYPE: ICD-10-CM

## 2019-12-30 RX ORDER — LEVOTHYROXINE, LIOTHYRONINE 38; 9 UG/1; UG/1
TABLET ORAL
Qty: 30 TABLET | Refills: 5 | Status: SHIPPED | OUTPATIENT
Start: 2019-12-30 | End: 2020-02-03

## 2020-01-29 ENCOUNTER — OFFICE VISIT (OUTPATIENT)
Dept: INTERNAL MEDICINE | Facility: CLINIC | Age: 50
End: 2020-01-29

## 2020-01-29 VITALS
HEART RATE: 79 BPM | OXYGEN SATURATION: 99 % | DIASTOLIC BLOOD PRESSURE: 80 MMHG | HEIGHT: 64 IN | SYSTOLIC BLOOD PRESSURE: 120 MMHG | BODY MASS INDEX: 26.29 KG/M2 | WEIGHT: 154 LBS

## 2020-01-29 DIAGNOSIS — H65.93 MIDDLE EAR EFFUSION, BILATERAL: ICD-10-CM

## 2020-01-29 DIAGNOSIS — E03.9 HYPOTHYROIDISM, UNSPECIFIED TYPE: Primary | ICD-10-CM

## 2020-01-29 DIAGNOSIS — Z79.890 ENCOUNTER FOR MONITORING ESTROGEN REPLACEMENT THERAPY FOLLOWING SURGICAL MENOPAUSE: ICD-10-CM

## 2020-01-29 DIAGNOSIS — Z51.81 ENCOUNTER FOR MONITORING ESTROGEN REPLACEMENT THERAPY FOLLOWING SURGICAL MENOPAUSE: ICD-10-CM

## 2020-01-29 DIAGNOSIS — J02.9 SORE THROAT: ICD-10-CM

## 2020-01-29 LAB
EXPIRATION DATE: NORMAL
INTERNAL CONTROL: NORMAL
Lab: NORMAL
S PYO AG THROAT QL: NEGATIVE

## 2020-01-29 PROCEDURE — 87880 STREP A ASSAY W/OPTIC: CPT | Performed by: NURSE PRACTITIONER

## 2020-01-29 PROCEDURE — 99214 OFFICE O/P EST MOD 30 MIN: CPT | Performed by: NURSE PRACTITIONER

## 2020-01-29 RX ORDER — ESTRADIOL 1 MG/1
1 TABLET ORAL DAILY
Qty: 30 TABLET | Refills: 5 | Status: SHIPPED | OUTPATIENT
Start: 2020-01-29 | End: 2020-01-29 | Stop reason: SDUPTHER

## 2020-01-29 RX ORDER — ESTRADIOL 1 MG/1
1 TABLET ORAL DAILY
Qty: 30 TABLET | Refills: 5 | Status: SHIPPED | OUTPATIENT
Start: 2020-01-29 | End: 2020-06-11 | Stop reason: SDUPTHER

## 2020-01-29 RX ORDER — LEVOTHYROXINE AND LIOTHYRONINE 38; 9 UG/1; UG/1
60 TABLET ORAL DAILY
Qty: 30 TABLET | Refills: 5 | Status: CANCELLED | OUTPATIENT
Start: 2020-01-29

## 2020-02-01 LAB
T4 FREE SERPL-MCNC: 0.93 NG/DL (ref 0.82–1.77)
TSH SERPL DL<=0.005 MIU/L-ACNC: 7.81 UIU/ML (ref 0.45–4.5)

## 2020-02-03 DIAGNOSIS — E03.9 HYPOTHYROIDISM, UNSPECIFIED TYPE: ICD-10-CM

## 2020-02-03 RX ORDER — LEVOTHYROXINE AND LIOTHYRONINE 57; 13.5 UG/1; UG/1
90 TABLET ORAL DAILY
Qty: 30 TABLET | Refills: 1 | Status: SHIPPED | OUTPATIENT
Start: 2020-02-03 | End: 2020-04-02

## 2020-04-02 DIAGNOSIS — E03.9 HYPOTHYROIDISM, UNSPECIFIED TYPE: ICD-10-CM

## 2020-04-02 RX ORDER — THYROID,PORK 90 MG
TABLET ORAL
Qty: 90 TABLET | Refills: 0 | Status: SHIPPED | OUTPATIENT
Start: 2020-04-02 | End: 2020-05-14 | Stop reason: SDUPTHER

## 2020-05-14 DIAGNOSIS — E03.9 HYPOTHYROIDISM, UNSPECIFIED TYPE: ICD-10-CM

## 2020-05-14 NOTE — TELEPHONE ENCOUNTER
PT HAD TO CANCEL APPOINTMENT FOR 5- (FOLLOW UP FOR THYROID)    SHE IS THE PRIMARY CARE GIVER FOR HER MOTHER, WHO IS UNDER HOSPICE CARE AND LIVING WITH HER    SHE STATED THAT HER THYROID MEDICATION SEEMS TO BE WORKING FINE AND WOULD LIKE TO PUSH HER APPOINTMENT OUT ANOTHER TWO MONTHS DUE TO COVID 19, BUT NEEDS A MEDICATION REFILL BEFORE HER NEXT APPOINTMENT

## 2020-05-15 RX ORDER — LEVOTHYROXINE AND LIOTHYRONINE 57; 13.5 UG/1; UG/1
90 TABLET ORAL DAILY
Qty: 90 TABLET | Refills: 0 | Status: SHIPPED | OUTPATIENT
Start: 2020-05-15 | End: 2020-06-15 | Stop reason: DRUGHIGH

## 2020-06-09 ENCOUNTER — OFFICE VISIT (OUTPATIENT)
Dept: INTERNAL MEDICINE | Facility: CLINIC | Age: 50
End: 2020-06-09

## 2020-06-09 VITALS
DIASTOLIC BLOOD PRESSURE: 60 MMHG | BODY MASS INDEX: 26.29 KG/M2 | TEMPERATURE: 97.6 F | WEIGHT: 154 LBS | OXYGEN SATURATION: 97 % | SYSTOLIC BLOOD PRESSURE: 122 MMHG | HEIGHT: 64 IN | HEART RATE: 85 BPM

## 2020-06-09 DIAGNOSIS — E06.3 HYPOTHYROIDISM DUE TO HASHIMOTO'S THYROIDITIS: Primary | ICD-10-CM

## 2020-06-09 DIAGNOSIS — J39.2 LESION OF PHARYNX: ICD-10-CM

## 2020-06-09 DIAGNOSIS — M25.50 ARTHRALGIA, UNSPECIFIED JOINT: ICD-10-CM

## 2020-06-09 DIAGNOSIS — R60.9 SWELLING: ICD-10-CM

## 2020-06-09 DIAGNOSIS — E03.8 HYPOTHYROIDISM DUE TO HASHIMOTO'S THYROIDITIS: Primary | ICD-10-CM

## 2020-06-09 DIAGNOSIS — R53.83 FATIGUE, UNSPECIFIED TYPE: ICD-10-CM

## 2020-06-09 DIAGNOSIS — Z51.81 ENCOUNTER FOR MONITORING ESTROGEN REPLACEMENT THERAPY FOLLOWING SURGICAL MENOPAUSE: ICD-10-CM

## 2020-06-09 DIAGNOSIS — Z79.890 ENCOUNTER FOR MONITORING ESTROGEN REPLACEMENT THERAPY FOLLOWING SURGICAL MENOPAUSE: ICD-10-CM

## 2020-06-09 PROCEDURE — 99214 OFFICE O/P EST MOD 30 MIN: CPT | Performed by: NURSE PRACTITIONER

## 2020-06-09 NOTE — PROGRESS NOTES
Chief Complaint   Patient presents with   • Hypothyroidism   • Edema     joints and aches   • Menopause       History of Present Illness  49 y.o.female presents for hypothyroid follow up and joint swelling aches.  Hypothyroidism due to hashimotos; onset years.  Dose increase in Jan to 90mcg; takes armour thyroid. Wants to consider trying synthroid instead. Feels like still isnt where needs to be. Positive fatigue, hair loss.  She plans on starting diet specific for Hashimoto's disease.     Concerned about spot on the back of her throat and wanting to see specialist. Has noticed spot several months; has not went away. White spot occasional sore throat. Remote smoking. No difficulty with swallowing    C/o aching joints all over with some swelling in lower ext. hands knees legs hurt. Dull ache rates Pain 2/10.  Has taken her lasix a couple times over the last week for leg swelling. C/o Increased in night thrist for past 2 weeks.   Pt states she has been working in her garden.  Increased stress;  Mom is actively dying and under hospice care at this time.     Post menopausal sp hysterectomy. On estrogen. Mammogram is current. Former smoker. No hx dvt.       Review of Systems   Constitutional: Positive for fatigue. Negative for chills, diaphoresis, fever, unexpected weight gain and unexpected weight loss.   HENT: Positive for mouth sores and sore throat. Negative for congestion, ear pain, postnasal drip, rhinorrhea, swollen glands, tinnitus, trouble swallowing and voice change.    Eyes: Negative for blurred vision and visual disturbance.   Respiratory: Negative for apnea, cough and shortness of breath.    Cardiovascular: Positive for palpitations and leg swelling. Negative for chest pain.   Gastrointestinal: Positive for constipation. Negative for abdominal pain, blood in stool and nausea.   Endocrine: Positive for heat intolerance and polydipsia. Negative for polyphagia.   Genitourinary: Positive for amenorrhea. Negative  for difficulty urinating.   Musculoskeletal: Positive for arthralgias, back pain, joint swelling, neck pain and neck stiffness.   Skin: Negative for dry skin and rash.   Allergic/Immunologic: Positive for environmental allergies.   Neurological: Negative for dizziness, light-headedness, headache and confusion.   Psychiatric/Behavioral: Positive for sleep disturbance. Negative for self-injury, suicidal ideas and depressed mood. The patient is nervous/anxious.          [facility]  The following portions of the patient's history were reviewed and updated as appropriate: allergies, current medications, past family history, past medical history, past social history, past surgical history and problem list.     Past Medical History:   Diagnosis Date   • Bilateral ovarian cysts    • Hashimoto's thyroiditis    • Hypothyroidism    • Urinary tract infection       Past Surgical History:   Procedure Laterality Date   • HYSTERECTOMY        Allergies   Allergen Reactions   • Sulfa Antibiotics Anaphylaxis   • Codeine Other (See Comments)     Pt states crazy dreams      Social History     Socioeconomic History   • Marital status: Single   Tobacco Use   • Smoking status: Former Smoker     Packs/day: 1.00     Years: 20.00     Pack years: 20.00     Types: Cigarettes     Last attempt to quit: 2016     Years since quittin.4   • Smokeless tobacco: Never Used   Substance and Sexual Activity   • Alcohol use: Yes     Comment: rare   • Drug use: No   • Sexual activity: Yes     Partners: Male     Family History   Problem Relation Age of Onset   • Hypertension Father    • Cancer Brother    • Thyroid disease Daughter             Current Outpatient Medications:   •  B COMPLEX VITAMINS ER PO, Take 1 capsule by mouth., Disp: , Rfl:   •  estradiol (ESTRACE) 1 MG tablet, Take 1 tablet by mouth Daily., Disp: 30 tablet, Rfl: 5  •  furosemide (LASIX) 20 MG tablet, Take 20 mg by mouth 2 (Two) Times a Day., Disp: , Rfl:   •  Thyroid (ARMOUR THYROID) 90 MG  "PO tablet, Take 1 tablet by mouth Daily., Disp: 90 tablet, Rfl: 0    VITALS:  /60   Pulse 85   Temp 97.6 °F (36.4 °C)   Ht 162.6 cm (64\")   Wt 69.9 kg (154 lb)   SpO2 97%   BMI 26.43 kg/m²     Physical Exam   Constitutional: She is oriented to person, place, and time. She appears well-developed and well-nourished. No distress.   HENT:   Nose: Nose normal.   Mouth/Throat: Uvula is midline, oropharynx is clear and moist and mucous membranes are normal. Oral lesions present.       Coarse hair   Eyes: Pupils are equal, round, and reactive to light. Conjunctivae, EOM and lids are normal.   Neck: Trachea normal and normal range of motion. Neck supple. No thyromegaly present.   Cardiovascular: Normal rate, regular rhythm, normal heart sounds and intact distal pulses.   Pulses:       Radial pulses are 2+ on the right side, and 2+ on the left side.        Dorsalis pedis pulses are 2+ on the right side, and 2+ on the left side.        Posterior tibial pulses are 1+ on the right side, and 1+ on the left side.   Trace ankle edema bilateral   Pulmonary/Chest: Effort normal and breath sounds normal. No respiratory distress. She has no decreased breath sounds.   Abdominal: Soft. Normal appearance, normal aorta and bowel sounds are normal. She exhibits no distension, no abdominal bruit and no ascites. There is no hepatosplenomegaly or splenomegaly. There is no tenderness. No hernia.   Musculoskeletal:        Right knee: Tenderness found.        Left knee: Tenderness found.        Right ankle: She exhibits swelling.        Left ankle: She exhibits swelling.   Lymphadenopathy:        Head (right side): No submental, no submandibular, no tonsillar, no preauricular, no posterior auricular and no occipital adenopathy present.        Head (left side): No submental, no submandibular, no tonsillar, no preauricular, no posterior auricular and no occipital adenopathy present.     She has no cervical adenopathy.     She has no " axillary adenopathy.        Right: No supraclavicular adenopathy present.        Left: No supraclavicular adenopathy present.   Neurological: She is alert and oriented to person, place, and time. She has normal strength. She exhibits normal muscle tone. Coordination normal. GCS eye subscore is 4. GCS verbal subscore is 5. GCS motor subscore is 6.   Skin: Skin is warm and dry. Capillary refill takes less than 2 seconds. Turgor is normal.        Psychiatric: She has a normal mood and affect. Her speech is normal and behavior is normal. Judgment and thought content normal. Her mood appears not anxious. Cognition and memory are normal. She does not exhibit a depressed mood.   Vitals reviewed.      LABS   pending      ASSESSMENT/PLAN  Cassia was seen today for hypothyroidism, edema and menopause.    Diagnoses and all orders for this visit:    Hypothyroidism due to Hashimoto's thyroiditis  -     Lipid Panel; Future  -     TSH; Future  -     T4, free; Future  -     levothyroxine (SYNTHROID, LEVOTHROID) 137 MCG tablet; Take 1 tablet by mouth Daily.  Stop armor thyroid and change to synthroid. Recheck thyroid labs 6 weeks.  Referral to endocrine placed.  Fatigue, unspecified type  -     Comprehensive Metabolic Panel; Future  -     CBC & Differential; Future  -     Vitamin D 25 Hydroxy; Future  -     Vitamin B12; Future  -     Folate; Future  -     Iron Profile; Future  -     Hemoglobin A1c; Future  -     Lyme Disease IgG/IgM Antibodies; Future    Arthralgia, unspecified joint  -     Sedimentation Rate; Future  -     JUNE; Future  -     Rheumatoid Factor; Future  -     Lyme Disease IgG/IgM Antibodies; Future    Swelling  -     proBNP; Future    Lesion of pharynx  -     Ambulatory Referral to ENT (Otolaryngology)    Encounter for monitoring estrogen replacement therapy following surgical menopause  -     estradiol (ESTRACE) 1 MG tablet; Take 1 tablet by mouth Daily.      I discussed the patients findings and my  recommendations with patient.  Patient was encouraged to keep me informed of any acute changes, lack of improvement, or any new concerning symptoms.  Patient voiced understanding of all instructions and denied further questions.      FOLLOW-UP  Return in about 6 weeks (around 7/21/2020), or if symptoms worsen or fail to improve, for Annual and fu thyroid fatigue.    Electronically signed by:    JEAN Castillo  06/09/2020    EMR Dragon/Transcription Disclaimer:  Much of this encounter note is an electronic transcription/translation of spoken language to printed text.  The electronic translation of spoken language may permit erroneous, or at times, nonsensical words or phrases to be inadvertently transcribed.  Although I have reviewed the note for such errors, some may still exist

## 2020-06-11 DIAGNOSIS — Z79.890 ENCOUNTER FOR MONITORING ESTROGEN REPLACEMENT THERAPY FOLLOWING SURGICAL MENOPAUSE: ICD-10-CM

## 2020-06-11 DIAGNOSIS — Z51.81 ENCOUNTER FOR MONITORING ESTROGEN REPLACEMENT THERAPY FOLLOWING SURGICAL MENOPAUSE: ICD-10-CM

## 2020-06-11 RX ORDER — ESTRADIOL 1 MG/1
TABLET ORAL
Qty: 90 TABLET | OUTPATIENT
Start: 2020-06-11

## 2020-06-11 RX ORDER — ESTRADIOL 1 MG/1
1 TABLET ORAL DAILY
Qty: 30 TABLET | Refills: 11 | Status: SHIPPED | OUTPATIENT
Start: 2020-06-11 | End: 2021-11-30

## 2020-06-12 ENCOUNTER — LAB (OUTPATIENT)
Dept: LAB | Facility: HOSPITAL | Age: 50
End: 2020-06-12

## 2020-06-12 DIAGNOSIS — R60.9 SWELLING: ICD-10-CM

## 2020-06-12 DIAGNOSIS — E03.8 HYPOTHYROIDISM DUE TO HASHIMOTO'S THYROIDITIS: ICD-10-CM

## 2020-06-12 DIAGNOSIS — R53.83 FATIGUE, UNSPECIFIED TYPE: ICD-10-CM

## 2020-06-12 DIAGNOSIS — E06.3 HYPOTHYROIDISM DUE TO HASHIMOTO'S THYROIDITIS: ICD-10-CM

## 2020-06-12 DIAGNOSIS — M25.50 ARTHRALGIA, UNSPECIFIED JOINT: ICD-10-CM

## 2020-06-12 LAB
25(OH)D3 SERPL-MCNC: 28.5 NG/ML (ref 30–100)
ALBUMIN SERPL-MCNC: 3.9 G/DL (ref 3.5–5.2)
ALBUMIN/GLOB SERPL: 1.6 G/DL
ALP SERPL-CCNC: 42 U/L (ref 39–117)
ALT SERPL W P-5'-P-CCNC: 18 U/L (ref 1–33)
ANION GAP SERPL CALCULATED.3IONS-SCNC: 10.5 MMOL/L (ref 5–15)
AST SERPL-CCNC: 17 U/L (ref 1–32)
BASOPHILS # BLD AUTO: 0.05 10*3/MM3 (ref 0–0.2)
BASOPHILS NFR BLD AUTO: 0.8 % (ref 0–1.5)
BILIRUB SERPL-MCNC: 0.5 MG/DL (ref 0.2–1.2)
BUN BLD-MCNC: 18 MG/DL (ref 6–20)
BUN/CREAT SERPL: 23.7 (ref 7–25)
CALCIUM SPEC-SCNC: 9.3 MG/DL (ref 8.6–10.5)
CHLORIDE SERPL-SCNC: 103 MMOL/L (ref 98–107)
CHOLEST SERPL-MCNC: 154 MG/DL (ref 0–200)
CHROMATIN AB SERPL-ACNC: <10 IU/ML (ref 0–14)
CO2 SERPL-SCNC: 22.5 MMOL/L (ref 22–29)
CREAT BLD-MCNC: 0.76 MG/DL (ref 0.57–1)
DEPRECATED RDW RBC AUTO: 41.2 FL (ref 37–54)
EOSINOPHIL # BLD AUTO: 0.16 10*3/MM3 (ref 0–0.4)
EOSINOPHIL NFR BLD AUTO: 2.5 % (ref 0.3–6.2)
ERYTHROCYTE [DISTWIDTH] IN BLOOD BY AUTOMATED COUNT: 12.6 % (ref 12.3–15.4)
ERYTHROCYTE [SEDIMENTATION RATE] IN BLOOD: 4 MM/HR (ref 0–30)
FOLATE SERPL-MCNC: 19.4 NG/ML (ref 4.78–24.2)
GFR SERPL CREATININE-BSD FRML MDRD: 80 ML/MIN/1.73
GLOBULIN UR ELPH-MCNC: 2.5 GM/DL
GLUCOSE BLD-MCNC: 91 MG/DL (ref 65–99)
HBA1C MFR BLD: 5.4 % (ref 4.8–5.6)
HCT VFR BLD AUTO: 38.4 % (ref 34–46.6)
HDLC SERPL-MCNC: 54 MG/DL (ref 40–60)
HGB BLD-MCNC: 12.9 G/DL (ref 12–15.9)
IMM GRANULOCYTES # BLD AUTO: 0.02 10*3/MM3 (ref 0–0.05)
IMM GRANULOCYTES NFR BLD AUTO: 0.3 % (ref 0–0.5)
IRON 24H UR-MRATE: 118 MCG/DL (ref 37–145)
IRON SATN MFR SERPL: 31 % (ref 20–50)
LDLC SERPL CALC-MCNC: 68 MG/DL (ref 0–100)
LDLC/HDLC SERPL: 1.26 {RATIO}
LYMPHOCYTES # BLD AUTO: 2.47 10*3/MM3 (ref 0.7–3.1)
LYMPHOCYTES NFR BLD AUTO: 38.9 % (ref 19.6–45.3)
MCH RBC QN AUTO: 30.1 PG (ref 26.6–33)
MCHC RBC AUTO-ENTMCNC: 33.6 G/DL (ref 31.5–35.7)
MCV RBC AUTO: 89.5 FL (ref 79–97)
MONOCYTES # BLD AUTO: 0.77 10*3/MM3 (ref 0.1–0.9)
MONOCYTES NFR BLD AUTO: 12.1 % (ref 5–12)
NEUTROPHILS # BLD AUTO: 2.88 10*3/MM3 (ref 1.7–7)
NEUTROPHILS NFR BLD AUTO: 45.4 % (ref 42.7–76)
NRBC BLD AUTO-RTO: 0 /100 WBC (ref 0–0.2)
NT-PROBNP SERPL-MCNC: 37.6 PG/ML (ref 5–900)
PLATELET # BLD AUTO: 228 10*3/MM3 (ref 140–450)
PMV BLD AUTO: 10.5 FL (ref 6–12)
POTASSIUM BLD-SCNC: 4.5 MMOL/L (ref 3.5–5.2)
PROT SERPL-MCNC: 6.4 G/DL (ref 6–8.5)
RBC # BLD AUTO: 4.29 10*6/MM3 (ref 3.77–5.28)
SODIUM BLD-SCNC: 136 MMOL/L (ref 136–145)
T4 FREE SERPL-MCNC: 1.01 NG/DL (ref 0.93–1.7)
TIBC SERPL-MCNC: 377 MCG/DL (ref 298–536)
TRANSFERRIN SERPL-MCNC: 253 MG/DL (ref 200–360)
TRIGL SERPL-MCNC: 161 MG/DL (ref 0–150)
TSH SERPL DL<=0.05 MIU/L-ACNC: 0.1 UIU/ML (ref 0.27–4.2)
VIT B12 BLD-MCNC: 254 PG/ML (ref 211–946)
VLDLC SERPL-MCNC: 32.2 MG/DL (ref 5–40)
WBC NRBC COR # BLD: 6.35 10*3/MM3 (ref 3.4–10.8)

## 2020-06-12 PROCEDURE — 82306 VITAMIN D 25 HYDROXY: CPT | Performed by: NURSE PRACTITIONER

## 2020-06-12 PROCEDURE — 83880 ASSAY OF NATRIURETIC PEPTIDE: CPT | Performed by: NURSE PRACTITIONER

## 2020-06-12 PROCEDURE — 86038 ANTINUCLEAR ANTIBODIES: CPT | Performed by: NURSE PRACTITIONER

## 2020-06-12 PROCEDURE — 86431 RHEUMATOID FACTOR QUANT: CPT | Performed by: NURSE PRACTITIONER

## 2020-06-12 PROCEDURE — 83036 HEMOGLOBIN GLYCOSYLATED A1C: CPT | Performed by: NURSE PRACTITIONER

## 2020-06-12 PROCEDURE — 84466 ASSAY OF TRANSFERRIN: CPT | Performed by: NURSE PRACTITIONER

## 2020-06-12 PROCEDURE — 86618 LYME DISEASE ANTIBODY: CPT | Performed by: NURSE PRACTITIONER

## 2020-06-12 PROCEDURE — 82607 VITAMIN B-12: CPT | Performed by: NURSE PRACTITIONER

## 2020-06-12 PROCEDURE — 85025 COMPLETE CBC W/AUTO DIFF WBC: CPT | Performed by: NURSE PRACTITIONER

## 2020-06-12 PROCEDURE — 83540 ASSAY OF IRON: CPT | Performed by: NURSE PRACTITIONER

## 2020-06-12 PROCEDURE — 82746 ASSAY OF FOLIC ACID SERUM: CPT | Performed by: NURSE PRACTITIONER

## 2020-06-12 PROCEDURE — 84439 ASSAY OF FREE THYROXINE: CPT | Performed by: NURSE PRACTITIONER

## 2020-06-12 PROCEDURE — 84443 ASSAY THYROID STIM HORMONE: CPT | Performed by: NURSE PRACTITIONER

## 2020-06-12 PROCEDURE — 80053 COMPREHEN METABOLIC PANEL: CPT | Performed by: NURSE PRACTITIONER

## 2020-06-12 PROCEDURE — 85652 RBC SED RATE AUTOMATED: CPT | Performed by: NURSE PRACTITIONER

## 2020-06-12 PROCEDURE — 80061 LIPID PANEL: CPT | Performed by: NURSE PRACTITIONER

## 2020-06-13 LAB
B BURGDOR IGG SER QL: NEGATIVE
B BURGDOR IGM SER QL: NEGATIVE

## 2020-06-15 ENCOUNTER — TELEPHONE (OUTPATIENT)
Dept: INTERNAL MEDICINE | Facility: CLINIC | Age: 50
End: 2020-06-15

## 2020-06-15 DIAGNOSIS — E03.8 HYPOTHYROIDISM DUE TO HASHIMOTO'S THYROIDITIS: ICD-10-CM

## 2020-06-15 DIAGNOSIS — E06.3 HYPOTHYROIDISM DUE TO HASHIMOTO'S THYROIDITIS: ICD-10-CM

## 2020-06-15 LAB — ANA SER QL: NEGATIVE

## 2020-06-15 RX ORDER — LEVOTHYROXINE SODIUM 137 UG/1
137 TABLET ORAL DAILY
Qty: 30 TABLET | Refills: 1 | Status: SHIPPED | OUTPATIENT
Start: 2020-06-15 | End: 2020-07-21 | Stop reason: SDUPTHER

## 2020-06-15 NOTE — TELEPHONE ENCOUNTER
----- Message from JEAN Breaux sent at 6/14/2020  9:45 PM EDT -----  Call pt with results. Negative rheumatoid factor and negative inflammatory markers. Vit D slightly low 28.5.  B12 normal. Electrolytes liver kidney function normal. Chol 154, triglycerides mildly elevated at 161. No anemia; normal iron level.  Thyroid lab slightly abnormal like a little too much thyroid medication.  By changing to synthroid instead of armor thyroid can get more specific dosing.  If she wants to still try the synthroid, I would recommend 137mcg daily.  We could go ahead and get her changed over while waiting for the endocrine referral.  Please let me know if she is ok with this medication change then referral.

## 2020-06-17 RX ORDER — LEVOTHYROXINE SODIUM 137 UG/1
TABLET ORAL
Qty: 90 TABLET | OUTPATIENT
Start: 2020-06-17

## 2020-06-19 NOTE — TELEPHONE ENCOUNTER
Is the August 17th visit too late for the appt, does that need to be rescheduled sooner  
Let pt know of message. VM    
Need in office visit so we can also review if any side effects or improvement in symptoms as well as labs. If you will schedule please.  
Please advise on 90 day supply  
Spoke with pt, let know of rf request, she is wanting to know if she needs appt or can she just come in to have repeat lab, no future lab ordered yet.  
That appt is fine.  
no on 90 day supply until she gets lab recheck in 6 weeks.  
Home

## 2020-07-10 NOTE — TELEPHONE ENCOUNTER
Caller: Cassia Ochoa    Relationship: Self    Best call back number: [phone]    Medication needed:   Requested Prescriptions     Pending Prescriptions Disp Refills   • furosemide (LASIX) 20 MG tablet       Sig: Take 1 tablet by mouth.       When do you need the refill by: ASAP    What details did the patient provide when requesting the medication:MEDIACATION WAS NOT PREVIOUSLY PRESCRIBED BY BRYANT    Does the patient have less than a 3 day supply:  [x] Yes  [] No    What is the patient's preferred pharmacy: [id number] [address]  [place] [facility] [place] [phone] [facility] [phone] FX         PT STATES THAT BOTH LEGS ARE SWOLLEN    PLEASE ADVISE

## 2020-07-11 RX ORDER — FUROSEMIDE 20 MG/1
20 TABLET ORAL 2 TIMES DAILY
Qty: 60 TABLET | Refills: 0 | Status: SHIPPED | OUTPATIENT
Start: 2020-07-11 | End: 2020-08-07

## 2020-07-11 NOTE — TELEPHONE ENCOUNTER
Pt called and she is in crital need for her lasix,   She is stuck in [facility] at the moment and states they have a [facility] there and she will be more than willing to get her labs done and fax it back to us, she can still make her apt on aug 17th but she needs this lasix ASAP

## 2020-07-11 NOTE — TELEPHONE ENCOUNTER
Called pt and she voiced understanding.    She wants her labs entered in, and she states she will be here on the 20th and she wants them done before her apt.

## 2020-07-13 DIAGNOSIS — E06.3 HYPOTHYROIDISM DUE TO HASHIMOTO'S THYROIDITIS: ICD-10-CM

## 2020-07-13 DIAGNOSIS — E03.9 HYPOTHYROIDISM, UNSPECIFIED TYPE: Primary | ICD-10-CM

## 2020-07-13 DIAGNOSIS — E03.8 HYPOTHYROIDISM DUE TO HASHIMOTO'S THYROIDITIS: ICD-10-CM

## 2020-07-14 NOTE — TELEPHONE ENCOUNTER
Let pt know I placed orders. Cannot be done to early as it literally takes 6 weeks after thyroid med change to get accurate lab.

## 2020-07-20 ENCOUNTER — OFFICE VISIT (OUTPATIENT)
Dept: INTERNAL MEDICINE | Facility: CLINIC | Age: 50
End: 2020-07-20

## 2020-07-20 ENCOUNTER — LAB (OUTPATIENT)
Dept: LAB | Facility: HOSPITAL | Age: 50
End: 2020-07-20

## 2020-07-20 VITALS
HEIGHT: 64 IN | SYSTOLIC BLOOD PRESSURE: 122 MMHG | BODY MASS INDEX: 26.63 KG/M2 | WEIGHT: 156 LBS | DIASTOLIC BLOOD PRESSURE: 70 MMHG | TEMPERATURE: 97.7 F | HEART RATE: 67 BPM | OXYGEN SATURATION: 98 %

## 2020-07-20 DIAGNOSIS — M79.89 LEG SWELLING: ICD-10-CM

## 2020-07-20 DIAGNOSIS — M79.89 LEG SWELLING: Primary | ICD-10-CM

## 2020-07-20 DIAGNOSIS — E03.9 HYPOTHYROIDISM, UNSPECIFIED TYPE: ICD-10-CM

## 2020-07-20 LAB
ANION GAP SERPL CALCULATED.3IONS-SCNC: 11.1 MMOL/L (ref 5–15)
BUN SERPL-MCNC: 18 MG/DL (ref 6–20)
BUN/CREAT SERPL: 26.1 (ref 7–25)
CALCIUM SPEC-SCNC: 10.1 MG/DL (ref 8.6–10.5)
CHLORIDE SERPL-SCNC: 103 MMOL/L (ref 98–107)
CO2 SERPL-SCNC: 22.9 MMOL/L (ref 22–29)
CREAT SERPL-MCNC: 0.69 MG/DL (ref 0.57–1)
GFR SERPL CREATININE-BSD FRML MDRD: 89 ML/MIN/1.73
GLUCOSE SERPL-MCNC: 85 MG/DL (ref 65–99)
POTASSIUM SERPL-SCNC: 4.2 MMOL/L (ref 3.5–5.2)
SODIUM SERPL-SCNC: 137 MMOL/L (ref 136–145)
T4 FREE SERPL-MCNC: 2.07 NG/DL (ref 0.93–1.7)
TSH SERPL DL<=0.05 MIU/L-ACNC: 0.05 UIU/ML (ref 0.27–4.2)

## 2020-07-20 PROCEDURE — 84443 ASSAY THYROID STIM HORMONE: CPT | Performed by: NURSE PRACTITIONER

## 2020-07-20 PROCEDURE — 36415 COLL VENOUS BLD VENIPUNCTURE: CPT

## 2020-07-20 PROCEDURE — 86376 MICROSOMAL ANTIBODY EACH: CPT | Performed by: NURSE PRACTITIONER

## 2020-07-20 PROCEDURE — 80048 BASIC METABOLIC PNL TOTAL CA: CPT | Performed by: NURSE PRACTITIONER

## 2020-07-20 PROCEDURE — 84439 ASSAY OF FREE THYROXINE: CPT | Performed by: NURSE PRACTITIONER

## 2020-07-20 PROCEDURE — 99213 OFFICE O/P EST LOW 20 MIN: CPT | Performed by: NURSE PRACTITIONER

## 2020-07-20 NOTE — PROGRESS NOTES
Chief Complaint   Patient presents with   • Thyroid Problem   • Edema     BLE        History of Present Illness  50 y.o.female presents for thyroid problem and bilateral lower extremity edema.  Hypothyroidism due to Hashimoto's chronic onset years.  Recent change in thyroid medications from [place] Thyroid over to levothyroxine Synthroid dose change around Jessi 15.  At that time patient had fatigue, hair loss, and edema in joints.  Her TSH was low.  JUNE and BNP normal.  Started on Synthroid 137 mcg daily.  Referral to endocrinology and plan on checking her thyroid labs in 6 weeks.  Last week patient was on vacation and started noticing extreme leg swelling bilateral with reddish brawny appearance.  Swelling and discoloration started mid thigh down the leg.  Also had some tingling in lower extremities.  She took some Lasix once daily which helped the edema but not back to normal.  She denies any shortness of breath or chest pain.  No calf pain.  She presents today concerned that she might have myxedema with her lower extremities and her thyroid problem.  Wants to go ahead and get thyroid labs rechecked.    Review of Systems   Constitutional: Negative for chills, fever, unexpected weight gain and unexpected weight loss.   Respiratory: Negative for shortness of breath.    Cardiovascular: Positive for leg swelling. Negative for chest pain.   Skin: Positive for color change.         [facility]  The following portions of the patient's history were reviewed and updated as appropriate: allergies, current medications, past family history, past medical history, past social history, past surgical history and problem list.     Past Medical History:   Diagnosis Date   • Bilateral ovarian cysts    • Hashimoto's thyroiditis    • Hypothyroidism    • Urinary tract infection       Past Surgical History:   Procedure Laterality Date   • APPENDECTOMY     • HYSTERECTOMY        Allergies   Allergen Reactions   • Sulfa Antibiotics Anaphylaxis  "  • Codeine Other (See Comments)     Pt states crazy dreams      Social History     Socioeconomic History   • Marital status: Single   Tobacco Use   • Smoking status: Former Smoker     Packs/day: 1.00     Years: 20.00     Pack years: 20.00     Types: Cigarettes     Last attempt to quit: 2016     Years since quittin.5   • Smokeless tobacco: Never Used   Substance and Sexual Activity   • Alcohol use: Yes     Comment: rare   • Drug use: No   • Sexual activity: Yes     Partners: Male     Family History   Problem Relation Age of Onset   • Hypertension Father    • Cancer Brother    • Thyroid disease Daughter             Current Outpatient Medications:   •  estradiol (ESTRACE) 1 MG tablet, Take 1 tablet by mouth Daily., Disp: 30 tablet, Rfl: 11  •  furosemide (LASIX) 20 MG tablet, Take 1 tablet by mouth 2 (Two) Times a Day., Disp: 60 tablet, Rfl: 0  •  levothyroxine (SYNTHROID, LEVOTHROID) 137 MCG tablet, Take 1 tablet by mouth Daily., Disp: 30 tablet, Rfl: 1    VITALS:  /70   Pulse 67   Temp 97.7 °F (36.5 °C)   Ht 162.6 cm (64\")   Wt 70.8 kg (156 lb)   SpO2 98%   BMI 26.78 kg/m²     Physical Exam   Constitutional: She appears well-developed and well-nourished. No distress.   Eyes: Pupils are equal, round, and reactive to light.   Cardiovascular: Normal rate, regular rhythm and normal heart sounds.   Trace bilateral lower extremity ankle edema.  No calf pain, swelling, tenderness, or erythema.   Pulmonary/Chest: Effort normal and breath sounds normal. No respiratory distress. She has no rales.   Neurological: She is alert.   Skin: Skin is warm and dry.   Vitals reviewed.      LABS  Pending      ASSESSMENT/PLAN  Cassia was seen today for thyroid problem and edema.    Diagnoses and all orders for this visit:    Leg swelling  -     Basic metabolic panel; Future    Hypothyroidism, unspecified type  -     Basic metabolic panel; Future  -     Thyroid Peroxidase Antibody; Future    I discussed adding potassium " supplement to her Lasix and patient declines does not want to take additional medicine.  Will check lab.    I discussed the patients findings and my recommendations with patient.  Patient was encouraged to keep me informed of any acute changes, lack of improvement, or any new concerning symptoms.  Patient voiced understanding of all instructions and denied further questions.      FOLLOW-UP  Return in about 3 months (around 10/20/2020), or if symptoms worsen or fail to improve.    Electronically signed by:    JEAN Castillo  07/20/2020    EMR Dragon/Transcription Disclaimer:  Much of this encounter note is an electronic transcription/translation of spoken language to printed text.  The electronic translation of spoken language may permit erroneous, or at times, nonsensical words or phrases to be inadvertently transcribed.  Although I have reviewed the note for such errors, some may still exist

## 2020-07-21 RX ORDER — LEVOTHYROXINE SODIUM 112 UG/1
112 TABLET ORAL DAILY
Qty: 50 TABLET | Refills: 0 | Status: SHIPPED | OUTPATIENT
Start: 2020-07-21 | End: 2020-09-02

## 2020-07-22 LAB — THYROPEROXIDASE AB SERPL-ACNC: 129 IU/ML (ref 0–34)

## 2020-08-07 RX ORDER — FUROSEMIDE 20 MG/1
TABLET ORAL
Qty: 180 TABLET | Refills: 0 | Status: SHIPPED | OUTPATIENT
Start: 2020-08-07 | End: 2021-04-07 | Stop reason: SDUPTHER

## 2020-08-07 NOTE — TELEPHONE ENCOUNTER
----- Message from Juana Benjamin MA sent at 8/7/2020  1:04 PM EDT -----  Regarding: FW: Referral Request  Contact: [phone]  This looks like it has been done, but I am uncertain.   ----- Message -----  From: Cassia Ochoa  Sent: 8/7/2020   9:34 AM EDT  To: Melchor Garcia [facility]  Subject: Referral Request                                 Hello! I had requested a referral to an ENT to get the spot on the back of my throat checked for cancer. Would you please see if I could get that referral soon?  Thank you.

## 2020-08-10 DIAGNOSIS — E03.9 HYPOTHYROIDISM, UNSPECIFIED TYPE: ICD-10-CM

## 2020-08-10 RX ORDER — LEVOTHYROXINE SODIUM 112 UG/1
112 TABLET ORAL DAILY
Qty: 50 TABLET | Refills: 0 | OUTPATIENT
Start: 2020-08-10

## 2020-08-27 ENCOUNTER — TELEPHONE (OUTPATIENT)
Dept: INTERNAL MEDICINE | Facility: CLINIC | Age: 50
End: 2020-08-27

## 2020-08-27 DIAGNOSIS — E03.9 HYPOTHYROIDISM, UNSPECIFIED TYPE: ICD-10-CM

## 2020-08-27 DIAGNOSIS — E03.8 HYPOTHYROIDISM DUE TO HASHIMOTO'S THYROIDITIS: Primary | ICD-10-CM

## 2020-08-27 DIAGNOSIS — E06.3 HYPOTHYROIDISM DUE TO HASHIMOTO'S THYROIDITIS: Primary | ICD-10-CM

## 2020-08-27 DIAGNOSIS — R53.83 FATIGUE, UNSPECIFIED TYPE: ICD-10-CM

## 2020-08-27 NOTE — TELEPHONE ENCOUNTER
PATIENT HAS A REFERRAL FOR THE ENDOCRINOLOGIST ON 9/2/20. THE PATIENT IS REQUESTING THAT A LAB IS ORDERED TO CHECK HER THYROID SO THAT HER NEW DR IS PROVIDED WITH THE MOST UPDATED INFORMATION.     PATIENT IS REQUESTING TO GET THIS DONE AT [facility] WITHIN THE FACILITY.     CONTACT: [phone]

## 2020-08-31 ENCOUNTER — LAB (OUTPATIENT)
Dept: LAB | Facility: HOSPITAL | Age: 50
End: 2020-08-31

## 2020-08-31 DIAGNOSIS — E03.8 HYPOTHYROIDISM DUE TO HASHIMOTO'S THYROIDITIS: ICD-10-CM

## 2020-08-31 DIAGNOSIS — R53.83 FATIGUE, UNSPECIFIED TYPE: ICD-10-CM

## 2020-08-31 DIAGNOSIS — E06.3 HYPOTHYROIDISM DUE TO HASHIMOTO'S THYROIDITIS: ICD-10-CM

## 2020-08-31 DIAGNOSIS — E03.9 HYPOTHYROIDISM, UNSPECIFIED TYPE: ICD-10-CM

## 2020-08-31 LAB
T4 FREE SERPL-MCNC: 2.05 NG/DL (ref 0.93–1.7)
TSH SERPL DL<=0.05 MIU/L-ACNC: 0.04 UIU/ML (ref 0.27–4.2)

## 2020-08-31 PROCEDURE — 86376 MICROSOMAL ANTIBODY EACH: CPT | Performed by: NURSE PRACTITIONER

## 2020-08-31 PROCEDURE — 84439 ASSAY OF FREE THYROXINE: CPT | Performed by: NURSE PRACTITIONER

## 2020-08-31 PROCEDURE — 84443 ASSAY THYROID STIM HORMONE: CPT | Performed by: NURSE PRACTITIONER

## 2020-08-31 NOTE — TELEPHONE ENCOUNTER
Lab orders placed due to patient coming into office to request completion prior to referral routed to provider for signature

## 2020-08-31 NOTE — TELEPHONE ENCOUNTER
I do not typically order labs until after a patient has been seen to establish care. It appears a dose change was made in July and provider noted a plan to repeat labs in 6 weeks. Any labs prior to appointment we need to be done by PCP. Otherwise, I will order them during appointment.

## 2020-09-01 LAB — THYROPEROXIDASE AB SERPL-ACNC: 100 IU/ML (ref 0–34)

## 2020-09-02 ENCOUNTER — OFFICE VISIT (OUTPATIENT)
Dept: ENDOCRINOLOGY | Facility: CLINIC | Age: 50
End: 2020-09-02

## 2020-09-02 VITALS
HEART RATE: 71 BPM | HEIGHT: 64 IN | BODY MASS INDEX: 26.73 KG/M2 | OXYGEN SATURATION: 99 % | DIASTOLIC BLOOD PRESSURE: 80 MMHG | SYSTOLIC BLOOD PRESSURE: 128 MMHG | WEIGHT: 156.6 LBS

## 2020-09-02 DIAGNOSIS — E03.9 HYPOTHYROIDISM, UNSPECIFIED TYPE: Primary | ICD-10-CM

## 2020-09-02 PROCEDURE — 99204 OFFICE O/P NEW MOD 45 MIN: CPT | Performed by: INTERNAL MEDICINE

## 2020-09-02 RX ORDER — LEVOTHYROXINE SODIUM 0.1 MG/1
100 TABLET ORAL DAILY
Qty: 30 TABLET | Refills: 5 | Status: SHIPPED | OUTPATIENT
Start: 2020-09-02 | End: 2020-10-30 | Stop reason: SDUPTHER

## 2020-09-02 NOTE — PROGRESS NOTES
Chief Complaint   Patient presents with   • Establish Care   • Hashimoto's Thyroiditis        New patient who is being seen in consultation regarding hypothyroidism at the request of Lottie Manriquez APRN HPI   Cassia Ochoa is a 50 y.o. female who presents for evaluation of hypothyroidism.     Patient presents today for evaluation of hypothyroidism which was diagnosed 5-6 years ago based on routine labs. She lost insurance and transiently ran out of medication before representing for care approximately 1 year ago. At that time, she was placed on armour thyroid based on reported history of hair loss while taking levothyroxine. Patient reports that she did not feel well on armour thyroid and was changed back to levothyroxine earlier this year. Most recent dose change 6 weeks ago, currently taking levothyroxine 112 mcg daily. She takes this first thing in the morning on an empty stomach, separate from all other medications.    Patient reports palpitations and anxiety.  Patient denies changes in bowel habits.   Patient denies heat or cold intolerance.  Patient denies changes in weight.  Patient denies hair, skin or nail changes. She reports longstanding hair loss in the shower.  Patient reports improved energy level.    Patient denies history of previous head/neck radiation.  Patient denies history of recent iodine exposure.  Patient denies taking OTC supplements such as biotin.  Patient denies personal or family history of thyroid cancer.     Past Medical History:   Diagnosis Date   • Bilateral ovarian cysts    • Hashimoto's thyroiditis    • Hypothyroidism    • Urinary tract infection      Past Surgical History:   Procedure Laterality Date   • APPENDECTOMY     • HYSTERECTOMY        Family History   Problem Relation Age of Onset   • Hypertension Father    • Cancer Brother    • Mental illness Brother    • Thyroid disease Daughter       Social History     Socioeconomic History   • Marital status: Single      "Spouse name: Not on file   • Number of children: Not on file   • Years of education: Not on file   • Highest education level: Not on file   Tobacco Use   • Smoking status: Former Smoker     Packs/day: 1.00     Years: 20.00     Pack years: 20.00     Types: Cigarettes     Last attempt to quit: 2016     Years since quittin.6   • Smokeless tobacco: Never Used   Substance and Sexual Activity   • Alcohol use: Yes     Comment: rare   • Drug use: No   • Sexual activity: Yes     Partners: Male      Allergies   Allergen Reactions   • Sulfa Antibiotics Anaphylaxis   • Codeine Other (See Comments)     Pt states crazy dreams      Current Outpatient Medications on File Prior to Visit   Medication Sig Dispense Refill   • estradiol (ESTRACE) 1 MG tablet Take 1 tablet by mouth Daily. 30 tablet 11   • furosemide (LASIX) 20 MG tablet TAKE 1 TABLET BY MOUTH TWICE DAILY (Patient taking differently: Take 20 mg by mouth As Needed.) 180 tablet 0   • [DISCONTINUED] levothyroxine (SYNTHROID, LEVOTHROID) 112 MCG tablet Take 1 tablet by mouth Daily. 50 tablet 0     No current facility-administered medications on file prior to visit.         Review of Systems   Constitutional: Negative for fatigue, unexpected weight gain and unexpected weight loss.   HENT: Negative for trouble swallowing and voice change.    Eyes: Negative for pain and visual disturbance.   Cardiovascular: Positive for palpitations. Negative for chest pain.   Gastrointestinal: Negative for constipation and diarrhea.   Endocrine: Positive for cold intolerance and heat intolerance.   Musculoskeletal: Negative for arthralgias and myalgias.   Skin: Negative for dry skin and rash.   Neurological: Negative for tremors and headache.   Psychiatric/Behavioral: Negative for depressed mood. The patient is not nervous/anxious.        Vitals:    20 1050   BP: 128/80   Pulse: 71   SpO2: 99%   Weight: 71 kg (156 lb 9.6 oz)   Height: 162.6 cm (64\")   Body mass index is 26.88 kg/m². "     Physical Exam   Constitutional: Vital signs are normal. She appears well-developed and well-nourished. She is cooperative.  Non-toxic appearance. She does not appear ill.   HENT:   Head: Normocephalic and atraumatic.   Right Ear: Hearing normal.   Left Ear: Hearing normal.   Nose: Nose normal.   Eyes: Pupils are equal, round, and reactive to light. Conjunctivae and EOM are normal.   Neck: Trachea normal. No thyromegaly present.   Cardiovascular: Normal rate and regular rhythm.   No murmur heard.  Pulmonary/Chest: Effort normal and breath sounds normal. No respiratory distress.   Abdominal: Soft. Bowel sounds are normal. She exhibits no distension. There is no hepatosplenomegaly. There is no tenderness.   Lymphadenopathy:        Head (right side): No submandibular adenopathy present.        Head (left side): No submandibular adenopathy present.     She has no cervical adenopathy.   Neurological: She is alert. She has normal strength and normal reflexes.   Skin: Skin is warm, dry and intact. No rash noted.   Psychiatric: She has a normal mood and affect. Her behavior is normal.   Vitals reviewed.         Labs/Imaging  Results for MAYITO FIERRO (MRN [id number]) as of 9/2/2020 11:59   Ref. Range 8/31/2020 09:46   TSH Baseline Latest Ref Range: 0.270 - 4.200 uIU/mL 0.036 (L)   Free T4 Latest Ref Range: 0.93 - 1.70 ng/dL 2.05 (H)   Thyroid Peroxidase Antibody Latest Ref Range: 0 - 34 IU/mL 100 (H)       Assessment and Plan    Mayito was seen today for establish care and hashimoto's thyroiditis.    Diagnoses and all orders for this visit:    Hypothyroidism, unspecified type  -Likely secondary to Hashimoto's disease given elevated TPO antibody  -TFTs completed earlier this week with continued low TSH  -Weight-based dose of levothyroxine approximately 112 mcg  -We will plan to reduce dose of levothyroxine.  Will decrease to 100 mcg daily, discussed possibly reducing to 88 mcg daily given degree of TSH suppression,  however patient is hesitant regarding want to change.  -Reviewed appropriate administration of thyroid hormone  -Reviewed symptoms of both hypo-and hyperthyroidism in detail, patient to contact the clinic in the interim between visits with any concerning changes.  -     levothyroxine (Synthroid) 100 MCG tablet; Take 1 tablet by mouth Daily. On empty stomach  -     TSH; Future  -     T4, Free; Future         Return in about 4 months (around 1/2/2021) for Next scheduled follow up. The patient was instructed to contact the clinic with any interval questions or concerns.    Louisa Sharp MD     Please note that portions of this document were completed using a voice recognition program. Efforts were made to edit the dictations, but occasionally words are mis-transcribed.

## 2020-09-17 ENCOUNTER — TRANSCRIBE ORDERS (OUTPATIENT)
Dept: GENERAL RADIOLOGY | Facility: HOSPITAL | Age: 50
End: 2020-09-17

## 2020-09-17 ENCOUNTER — HOSPITAL ENCOUNTER (OUTPATIENT)
Dept: GENERAL RADIOLOGY | Facility: HOSPITAL | Age: 50
Discharge: HOME | End: 2020-09-17
Admitting: CHIROPRACTOR

## 2020-09-17 DIAGNOSIS — M54.12 CERVICAL RADICULITIS: Primary | ICD-10-CM

## 2020-09-17 DIAGNOSIS — M54.12 RADICULAR SYNDROME OF UPPER LIMBS: Primary | ICD-10-CM

## 2020-09-17 DIAGNOSIS — M99.01 CERVICAL SEGMENT DYSFUNCTION: Primary | ICD-10-CM

## 2020-09-17 DIAGNOSIS — M54.12 BRACHIAL NEURITIS: ICD-10-CM

## 2020-09-17 DIAGNOSIS — M99.01 CERVICAL SEGMENT DYSFUNCTION: ICD-10-CM

## 2020-09-17 PROCEDURE — 72040 X-RAY EXAM NECK SPINE 2-3 VW: CPT

## 2020-09-30 ENCOUNTER — TELEMEDICINE (OUTPATIENT)
Dept: FAMILY MEDICINE CLINIC | Facility: TELEHEALTH | Age: 50
End: 2020-09-30

## 2020-09-30 DIAGNOSIS — T36.95XA ANTIBIOTIC-INDUCED YEAST INFECTION: ICD-10-CM

## 2020-09-30 DIAGNOSIS — J06.9 UPPER RESPIRATORY TRACT INFECTION, UNSPECIFIED TYPE: Primary | ICD-10-CM

## 2020-09-30 DIAGNOSIS — B37.9 ANTIBIOTIC-INDUCED YEAST INFECTION: ICD-10-CM

## 2020-09-30 PROCEDURE — 99213 OFFICE O/P EST LOW 20 MIN: CPT | Performed by: NURSE PRACTITIONER

## 2020-09-30 RX ORDER — AMOXICILLIN AND CLAVULANATE POTASSIUM 875; 125 MG/1; MG/1
1 TABLET, FILM COATED ORAL 2 TIMES DAILY
Qty: 20 TABLET | Refills: 0 | Status: SHIPPED | OUTPATIENT
Start: 2020-09-30 | End: 2020-10-10

## 2020-09-30 RX ORDER — AZELASTINE 1 MG/ML
SPRAY, METERED NASAL EVERY 12 HOURS SCHEDULED
COMMUNITY
End: 2020-09-30

## 2020-09-30 RX ORDER — FLUTICASONE PROPIONATE 50 MCG
SPRAY, SUSPENSION (ML) NASAL
COMMUNITY

## 2020-09-30 RX ORDER — CETIRIZINE HYDROCHLORIDE 10 MG/1
TABLET ORAL
COMMUNITY
End: 2020-11-13

## 2020-09-30 RX ORDER — FLUCONAZOLE 150 MG/1
TABLET ORAL
Qty: 2 TABLET | Refills: 0 | Status: SHIPPED | OUTPATIENT
Start: 2020-09-30 | End: 2020-11-13

## 2020-09-30 NOTE — PATIENT INSTRUCTIONS
Cough, Adult  Coughing is a reflex that clears your throat and your airways (respiratory system). Coughing helps to heal and protect your lungs. It is normal to cough occasionally, but a cough that happens with other symptoms or lasts a long time may be a sign of a condition that needs treatment. An acute cough may only last 2-3 weeks, while a chronic cough may last 8 or more weeks.  Coughing is commonly caused by:  · Infection of the respiratory systemby viruses or bacteria.  · Breathing in substances that irritate your lungs.  · Allergies.  · Asthma.  · Mucus that runs down the back of your throat (postnasal drip).  · Smoking.  · Acid backing up from the stomach into the esophagus (gastroesophageal reflux).  · Certain medicines.  · Chronic lung problems.  · Other medical conditions such as heart failure or a blood clot in the lung (pulmonary embolism).  Follow these instructions at home:  Medicines  · Take over-the-counter and prescription medicines only as told by your health care provider.  · Talk with your health care provider before you take a cough suppressant medicine.  Lifestyle    · Avoid cigarette smoke. Do not use any products that contain nicotine or tobacco, such as cigarettes, e-cigarettes, and chewing tobacco. If you need help quitting, ask your health care provider.  · Drink enough fluid to keep your urine pale yellow.  · Avoid caffeine.  · Do not drink alcohol if your health care provider tells you not to drink.  General instructions    · Pay close attention to changes in your cough. Tell your health care provider about them.  · Always cover your mouth when you cough.  · Avoid things that make you cough, such as perfume, candles, cleaning products, or campfire or tobacco smoke.  · If the air is dry, use a cool mist vaporizer or humidifier in your bedroom or your home to help loosen secretions.  · If your cough is worse at night, try to sleep in a semi-upright position.  · Rest as needed.  · Keep  all follow-up visits as told by your health care provider. This is important.  Contact a health care provider if you:  · Have new symptoms.  · Cough up pus.  · Have a cough that does not get better after 2-3 weeks or gets worse.  · Cannot control your cough with cough suppressant medicines and you are losing sleep.  · Have pain that gets worse or pain that is not helped with medicine.  · Have a fever.  · Have unexplained weight loss.  · Have night sweats.  Get help right away if:  · You cough up blood.  · You have difficulty breathing.  · Your heartbeat is very fast.  These symptoms may represent a serious problem that is an emergency. Do not wait to see if the symptoms will go away. Get medical help right away. Call your local emergency services (911 in the [place] Do not drive yourself to the hospital.  Summary  · Coughing is a reflex that clears your throat and your airways. It is normal to cough occasionally, but a cough that happens with other symptoms or lasts a long time may be a sign of a condition that needs treatment.  · Take over-the-counter and prescription medicines only as told by your health care provider.  · Always cover your mouth when you cough.  · Contact a health care provider if you have new symptoms or a cough that does not get better after 2-3 weeks or gets worse.  This information is not intended to replace advice given to you by your health care provider. Make sure you discuss any questions you have with your health care provider.  Document Released: 06/15/2012 Document Revised: 01/06/2020 Document Reviewed: 01/06/2020  [employer] Patient Education © 2020 [employer]    Upper Respiratory Infection, Adult  An upper respiratory infection (URI) is a common viral infection of the nose, throat, and upper air passages that lead to the lungs. The most common type of URI is the common cold. URIs usually get better on their own, without medical treatment.  What are the causes?  A URI is caused by a  virus. You may catch a virus by:  · Breathing in droplets from an infected person's cough or sneeze.  · Touching something that has been exposed to the virus (contaminated) and then touching your mouth, nose, or eyes.  What increases the risk?  You are more likely to get a URI if:  · You are very young or very old.  · It is mahin or winter.  · You have close contact with others, such as at a , school, or health care facility.  · You smoke.  · You have long-term (chronic) heart or lung disease.  · You have a weakened disease-fighting (immune) system.  · You have nasal allergies or asthma.  · You are experiencing a lot of stress.  · You work in an area that has poor air circulation.  · You have poor nutrition.  What are the signs or symptoms?  A URI usually involves some of the following symptoms:  · Runny or stuffy (congested) nose.  · Sneezing.  · Cough.  · Sore throat.  · Headache.  · Fatigue.  · Fever.  · Loss of appetite.  · Pain in your forehead, behind your eyes, and over your cheekbones (sinus pain).  · Muscle aches.  · Redness or irritation of the eyes.  · Pressure in the ears or face.  How is this diagnosed?  This condition may be diagnosed based on your medical history and symptoms, and a physical exam. Your health care provider may use a cotton swab to take a mucus sample from your nose (nasal swab). This sample can be tested to determine what virus is causing the illness.  How is this treated?  URIs usually get better on their own within 7-10 days. You can take steps at home to relieve your symptoms. Medicines cannot cure URIs, but your health care provider may recommend certain medicines to help relieve symptoms, such as:  · Over-the-counter cold medicines.  · Cough suppressants. Coughing is a type of defense against infection that helps to clear the respiratory system, so take these medicines only as recommended by your health care provider.  · Fever-reducing medicines.  Follow these  instructions at home:  Activity  · Rest as needed.  · If you have a fever, stay home from work or school until your fever is gone or until your health care provider says you are no longer contagious. Your health care provider may have you wear a face mask to prevent your infection from spreading.  Relieving symptoms  · Gargle with a salt-water mixture 3-4 times a day or as needed. To make a salt-water mixture, completely dissolve ½-1 tsp of salt in 1 cup of warm water.  · Use a cool-mist humidifier to add moisture to the air. This can help you breathe more easily.  Eating and drinking    · Drink enough fluid to keep your urine pale yellow.  · Eat soups and other clear broths.  General instructions    · Take over-the-counter and prescription medicines only as told by your health care provider. These include cold medicines, fever reducers, and cough suppressants.  · Do not use any products that contain nicotine or tobacco, such as cigarettes and e-cigarettes. If you need help quitting, ask your health care provider.  · Stay away from secondhand smoke.  · Stay up to date on all immunizations, including the yearly (annual) flu vaccine.  · Keep all follow-up visits as told by your health care provider. This is important.  How to prevent the spread of infection to others    · URIs can be passed from person to person (are contagious). To prevent the infection from spreading:  ? Wash your hands often with soap and water. If soap and water are not available, use hand .  ? Avoid touching your mouth, face, eyes, or nose.  ? Cough or sneeze into a tissue or your sleeve or elbow instead of into your hand or into the air.  Contact a health care provider if:  · You are getting worse instead of better.  · You have a fever or chills.  · Your mucus is brown or red.  · You have yellow or brown discharge coming from your nose.  · You have pain in your face, especially when you bend forward.  · You have swollen neck  glands.  · You have pain while swallowing.  · You have white areas in the back of your throat.  Get help right away if:  · You have shortness of breath that gets worse.  · You have severe or persistent:  ? Headache.  ? Ear pain.  ? Sinus pain.  ? Chest pain.  · You have chronic lung disease along with any of the following:  ? Wheezing.  ? Prolonged cough.  ? Coughing up blood.  ? A change in your usual mucus.  · You have a stiff neck.  · You have changes in your:  ? Vision.  ? Hearing.  ? Thinking.  ? Mood.  Summary  · An upper respiratory infection (URI) is a common infection of the nose, throat, and upper air passages that lead to the lungs.  · A URI is caused by a virus.  · URIs usually get better on their own within 7-10 days.  · Medicines cannot cure URIs, but your health care provider may recommend certain medicines to help relieve symptoms.  This information is not intended to replace advice given to you by your health care provider. Make sure you discuss any questions you have with your health care provider.  Document Released: 06/13/2002 Document Revised: 12/26/2019 Document Reviewed: 08/03/2018  [employer] Patient Education © 2020 [employer]    Acute Bronchitis, Adult    Acute bronchitis is sudden (acute) swelling of the air tubes (bronchi) in the lungs. Acute bronchitis causes these tubes to fill with mucus, which can make it hard to breathe. It can also cause coughing or wheezing.  In adults, acute bronchitis usually goes away within 2 weeks. A cough caused by bronchitis may last up to 3 weeks. Smoking, allergies, and asthma can make the condition worse. Repeated episodes of bronchitis may cause further lung problems, such as chronic obstructive pulmonary disease (COPD).  What are the causes?  This condition can be caused by germs and by substances that irritate the lungs, including:  · Cold and flu viruses. This condition is most often caused by the same virus that causes a  cold.  · Bacteria.  · Exposure to tobacco smoke, dust, fumes, and air pollution.  What increases the risk?  This condition is more likely to develop in people who:  · Have close contact with someone with acute bronchitis.  · Are exposed to lung irritants, such as tobacco smoke, dust, fumes, and vapors.  · Have a weak immune system.  · Have a respiratory condition such as asthma.  What are the signs or symptoms?  Symptoms of this condition include:  · A cough.  · Coughing up clear, yellow, or green mucus.  · Wheezing.  · Chest congestion.  · Shortness of breath.  · A fever.  · Body aches.  · Chills.  · A sore throat.  How is this diagnosed?  This condition is usually diagnosed with a physical exam. During the exam, your health care provider may order tests, such as chest X-rays, to rule out other conditions. He or she may also:  · Test a sample of your mucus for bacterial infection.  · Check the level of oxygen in your blood. This is done to check for pneumonia.  · Do a chest X-ray or lung function testing to rule out pneumonia and other conditions.  · Perform blood tests.  Your health care provider will also ask about your symptoms and medical history.  How is this treated?  Most cases of acute bronchitis clear up over time without treatment. Your health care provider may recommend:  · Drinking more fluids. Drinking more makes your mucus thinner, which may make it easier to breathe.  · Taking a medicine for a fever or cough.  · Taking an antibiotic medicine.  · Using an inhaler to help improve shortness of breath and to control a cough.  · Using a cool mist vaporizer or humidifier to make it easier to breathe.  Follow these instructions at home:  Medicines  · Take over-the-counter and prescription medicines only as told by your health care provider.  · If you were prescribed an antibiotic, take it as told by your health care provider. Do not stop taking the antibiotic even if you start to feel better.  General  instructions    · Get plenty of rest.  · Drink enough fluids to keep your urine pale yellow.  · Avoid smoking and secondhand smoke. Exposure to cigarette smoke or irritating chemicals will make bronchitis worse. If you smoke and you need help quitting, ask your health care provider. Quitting smoking will help your lungs heal faster.  · Use an inhaler, cool mist vaporizer, or humidifier as told by your health care provider.  · Keep all follow-up visits as told by your health care provider. This is important.  How is this prevented?  To lower your risk of getting this condition again:  · Wash your hands often with soap and water. If soap and water are not available, use hand .  · Avoid contact with people who have cold symptoms.  · Try not to touch your hands to your mouth, nose, or eyes.  · Make sure to get the flu shot every year.  Contact a health care provider if:  · Your symptoms do not improve in 2 weeks of treatment.  Get help right away if:  · You cough up blood.  · You have chest pain.  · You have severe shortness of breath.  · You become dehydrated.  · You faint or keep feeling like you are going to faint.  · You keep vomiting.  · You have a severe headache.  · Your fever or chills gets worse.  This information is not intended to replace advice given to you by your health care provider. Make sure you discuss any questions you have with your health care provider.  Document Released: 01/25/2006 Document Revised: 10/31/2019 Document Reviewed: 06/07/2017  [employer] Patient Education © 2020 [employer]

## 2020-09-30 NOTE — PROGRESS NOTES
CHIEF COMPLAINT  Chief Complaint   Patient presents with   • Cough   • Nasal Congestion   • Earache         HPI  Cassia Ochoa is a 50 y.o. female  presents with complaint of 1.5 weeks of nasal congestion with clear drainage, ear congestion, occasional cough with green sputum production. No fever, wheezing, shortness of breath.  Has Flonase but has not been using this; also takes zyrtec    COVID-19 was negative last week    Review of Systems   Constitutional: Positive for fatigue. Negative for activity change, appetite change, chills, diaphoresis and fever.   HENT: Positive for congestion, ear pain, postnasal drip and sinus pressure. Negative for sinus pain and sore throat.    Respiratory: Positive for cough. Negative for chest tightness, shortness of breath and wheezing.    Neurological: Negative for dizziness and headaches.   All other systems reviewed and are negative.      Past Medical History:   Diagnosis Date   • Bilateral ovarian cysts    • Hashimoto's thyroiditis    • Hypothyroidism    • Urinary tract infection        Family History   Problem Relation Age of Onset   • Hypertension Father    • Cancer Brother    • Mental illness Brother    • Thyroid disease Daughter        Social History     Socioeconomic History   • Marital status: Single     Spouse name: Not on file   • Number of children: Not on file   • Years of education: Not on file   • Highest education level: Not on file   Tobacco Use   • Smoking status: Former Smoker     Packs/day: 1.00     Years: 20.00     Pack years: 20.00     Types: Cigarettes     Quit date:      Years since quittin.7   • Smokeless tobacco: Never Used   Substance and Sexual Activity   • Alcohol use: Yes     Comment: rare   • Drug use: No   • Sexual activity: Yes     Partners: Male         There were no vitals taken for this visit.    PHYSICAL EXAM  Physical Exam   Constitutional: She is oriented to person, place, and time. She appears well-developed and well-nourished.    HENT:   Head: Normocephalic and atraumatic.   Negative for frontal or maxillary sinus tenderness; is having lots of pressure in bilateral ears--per patient   Pulmonary/Chest: Effort normal.  No respiratory distress (congested cough observed during visit).  Lymphadenopathy:   Mild enlargement and tenderness of bilateral anterior cervical regions   Neurological: She is alert and oriented to person, place, and time.         Cassia was seen today for cough, nasal congestion and earache.    Diagnoses and all orders for this visit:    Upper respiratory tract infection, unspecified type  -     amoxicillin-clavulanate (Augmentin) 875-125 MG per tablet; Take 1 tablet by mouth 2 (Two) Times a Day for 10 days.  --complete Augmentin as prescribed  --take with food to decrease risk of GI symptoms  --increase intake of clear, decaffeinated fluids  --may also take Mucinex to help thin mucous   --tylenol or ibuprofen for pain   --rest as needed    Antibiotic-induced yeast infection  -     fluconazole (DIFLUCAN) 150 MG tablet; Take 1 tablet now, repeat in 72 hours if symptoms continue  --take Diflucan as prescribed if vaginal yeast infection occurs while taking antibiotic.        **if at any time experiences fever AND/OR worsening cough AND/OR shortness of breath AND/OR loss of sense of taste or smell, has been advised to go to nearest urgent care or emergency department for evaluation AND/OR testing    **if no improvement in 5-7 days, but not worsening, may schedule a f/u virtual visit or E-visit      FOLLOW-UP  As discussed during visit with PCP/Virtual Care if no improvement or Urgent Care/Emergency Department if worsening of symptoms    Patient verbalizes understanding of medication dosage, comfort measures, instructions for treatment and follow-up.    Coco Pederson, JEAN  09/30/2020  07:33 EDT    This visit was performed via Telehealth.  This patient has been instructed to follow-up with their primary care provider if  their symptoms worsen or the treatment provided does not resolve their illness.

## 2020-10-27 ENCOUNTER — TELEPHONE (OUTPATIENT)
Dept: ENDOCRINOLOGY | Facility: CLINIC | Age: 50
End: 2020-10-27

## 2020-10-27 NOTE — TELEPHONE ENCOUNTER
Informed patient, there are two labs that Dr. Sharp order in September that have not been drawn yet.  patient will get these two labs done before her insurance runs out.

## 2020-10-27 NOTE — TELEPHONE ENCOUNTER
PT CALLED WANTING US TO PUT ORDERS FOR HER TO HAVE LABS DRAWN. HER INSURANCE RUNS OUT IN FIVE DAYS. PLEASE AND THANK YOU

## 2020-10-30 ENCOUNTER — LAB (OUTPATIENT)
Dept: LAB | Facility: HOSPITAL | Age: 50
End: 2020-10-30

## 2020-10-30 ENCOUNTER — LAB (OUTPATIENT)
Dept: ENDOCRINOLOGY | Facility: CLINIC | Age: 50
End: 2020-10-30

## 2020-10-30 DIAGNOSIS — E03.9 HYPOTHYROIDISM, UNSPECIFIED TYPE: ICD-10-CM

## 2020-10-30 LAB
T4 FREE SERPL-MCNC: 1.51 NG/DL (ref 0.93–1.7)
TSH SERPL DL<=0.05 MIU/L-ACNC: 0.33 UIU/ML (ref 0.27–4.2)

## 2020-10-30 PROCEDURE — 84443 ASSAY THYROID STIM HORMONE: CPT | Performed by: INTERNAL MEDICINE

## 2020-10-30 PROCEDURE — 84439 ASSAY OF FREE THYROXINE: CPT | Performed by: INTERNAL MEDICINE

## 2020-10-30 RX ORDER — LEVOTHYROXINE SODIUM 0.1 MG/1
100 TABLET ORAL DAILY
Qty: 30 TABLET | Refills: 5 | Status: SHIPPED | OUTPATIENT
Start: 2020-10-30 | End: 2021-01-05 | Stop reason: SDUPTHER

## 2020-11-10 ENCOUNTER — TELEPHONE (OUTPATIENT)
Dept: INTERNAL MEDICINE | Facility: CLINIC | Age: 50
End: 2020-11-10

## 2020-11-10 NOTE — TELEPHONE ENCOUNTER
PATIENT HAVING SOME PROBLEMS WITH DEPRESSION AND WOULD LIKE TO SPEAK WITH THE NURSE TO RELAY MESSAGE TO PROVIDER.     BEST CALL NUMBER [phone]

## 2020-11-13 ENCOUNTER — OFFICE VISIT (OUTPATIENT)
Dept: INTERNAL MEDICINE | Facility: CLINIC | Age: 50
End: 2020-11-13

## 2020-11-13 VITALS
BODY MASS INDEX: 26.46 KG/M2 | TEMPERATURE: 97.5 F | HEIGHT: 64 IN | WEIGHT: 155 LBS | HEART RATE: 86 BPM | OXYGEN SATURATION: 98 % | SYSTOLIC BLOOD PRESSURE: 132 MMHG | DIASTOLIC BLOOD PRESSURE: 88 MMHG

## 2020-11-13 DIAGNOSIS — F32.1 CURRENT MODERATE EPISODE OF MAJOR DEPRESSIVE DISORDER WITHOUT PRIOR EPISODE (HCC): Primary | ICD-10-CM

## 2020-11-13 DIAGNOSIS — G89.29 CHRONIC RIGHT-SIDED THORACIC BACK PAIN: ICD-10-CM

## 2020-11-13 DIAGNOSIS — G89.29 CHRONIC RIGHT-SIDED LOW BACK PAIN WITH RIGHT-SIDED SCIATICA: ICD-10-CM

## 2020-11-13 DIAGNOSIS — M54.41 CHRONIC RIGHT-SIDED LOW BACK PAIN WITH RIGHT-SIDED SCIATICA: ICD-10-CM

## 2020-11-13 DIAGNOSIS — M54.6 CHRONIC RIGHT-SIDED THORACIC BACK PAIN: ICD-10-CM

## 2020-11-13 PROCEDURE — 99214 OFFICE O/P EST MOD 30 MIN: CPT | Performed by: NURSE PRACTITIONER

## 2020-11-13 RX ORDER — BUPROPION HYDROCHLORIDE 150 MG/1
150 TABLET ORAL DAILY
Qty: 30 TABLET | Refills: 0 | Status: SHIPPED | OUTPATIENT
Start: 2020-11-13 | End: 2020-12-11 | Stop reason: SDUPTHER

## 2020-11-13 NOTE — PROGRESS NOTES
"Chief Complaint   Patient presents with   • Depression       History of Present Illness  50 y.o.female presents for depression and back pain.    Depression  Visit Type: initial  Onset of symptoms: 1 to 6 months ago  Progression since onset: gradually worsening  Patient presents with the following symptoms: decreased concentration, depressed mood, fatigue and restlessness.  Patient is not experiencing: nervousness/anxiety, shortness of breath, suicidal ideas and thoughts of death.  Frequency of symptoms: most days   Severity: moderate   Aggravated by: family issues  Treatment tried: lifestyle changes  Improvement on treatment: no relief      Both parents  this year; worse depression crying a lot.  Answers for HPI/ROS submitted by the patient on 2020   What is the primary reason for your visit?: Other  Please describe your symptoms.: Depression  Have you had these symptoms before?: Yes  How long have you been having these symptoms?: Greater than 2 weeks  Please describe any probable cause for these symptoms. : Not sure but this year i lost both parents, my job and some other stressors. I feel like it is the grief of loosing my parents that is making this so bad. But the depression shows in my daily life more than it ever has. I know i need help.      Was injured 20 years ago with low back pain \"severed muscles\". Gets frequent Sciatic nerve pain down right hip; feels bunched up. Has bothering her a lot over last few months. Every day certain amt of pain. Used to be active; now just a little activity and she can be out for the rest of the day. Would like to try to get improved.    Review of Systems   Constitutional: Negative for appetite change, chills and fever.   Respiratory: Negative for shortness of breath.    Cardiovascular: Negative for chest pain.   Musculoskeletal: Positive for arthralgias and back pain.   Neurological: Negative for weakness and numbness.   Psychiatric/Behavioral: Positive for " decreased concentration, depressed mood and stress. Negative for self-injury and suicidal ideas. The patient is not nervous/anxious.          [facility]  The following portions of the patient's history were reviewed and updated as appropriate: allergies, current medications, past family history, past medical history, past social history, past surgical history and problem list.     Past Medical History:   Diagnosis Date   • Bilateral ovarian cysts    • Hashimoto's thyroiditis    • Hypothyroidism    • Urinary tract infection       Past Surgical History:   Procedure Laterality Date   • APPENDECTOMY     • HYSTERECTOMY        Allergies   Allergen Reactions   • Sulfa Antibiotics Anaphylaxis   • Codeine Other (See Comments)     Pt states crazy dreams      Social History     Socioeconomic History   • Marital status: Single   Tobacco Use   • Smoking status: Former Smoker     Packs/day: 1.00     Years: 20.00     Pack years: 20.00     Types: Cigarettes     Quit date:      Years since quittin.8   • Smokeless tobacco: Never Used   Substance and Sexual Activity   • Alcohol use: Yes     Comment: rare   • Drug use: No   • Sexual activity: Yes     Partners: Male     Family History   Problem Relation Age of Onset   • Hypertension Father    • Cancer Brother    • Mental illness Brother    • Thyroid disease Daughter             Current Outpatient Medications:   •  estradiol (ESTRACE) 1 MG tablet, Take 1 tablet by mouth Daily., Disp: 30 tablet, Rfl: 11  •  fluticasone (FLONASE) 50 MCG/ACT nasal spray, fluticasone propionate 50 mcg/actuation nasal spray,suspension  Spray 1 spray every day by intranasal route for 30 days., Disp: , Rfl:   •  furosemide (LASIX) 20 MG tablet, TAKE 1 TABLET BY MOUTH TWICE DAILY (Patient taking differently: Take 20 mg by mouth As Needed.), Disp: 180 tablet, Rfl: 0  •  levothyroxine (Synthroid) 100 MCG tablet, Take 1 tablet by mouth Daily. On empty stomach, Disp: 30 tablet, Rfl: 5    VITALS:  /88    "Pulse 86   Temp 97.5 °F (36.4 °C)   Ht 162.6 cm (64\")   Wt 70.3 kg (155 lb)   SpO2 98%   Breastfeeding No   BMI 26.61 kg/m²     Physical Exam  HENT:      Head: Normocephalic.   Pulmonary:      Effort: Pulmonary effort is normal. No respiratory distress.   Musculoskeletal:      Thoracic back: She exhibits decreased range of motion and tenderness. She exhibits no bony tenderness.      Lumbar back: She exhibits decreased range of motion and tenderness. She exhibits no bony tenderness and no swelling.   Skin:     General: Skin is warm and dry.   Neurological:      General: No focal deficit present.      Mental Status: She is alert and oriented to person, place, and time.      Motor: No weakness.      Gait: Gait normal.   Psychiatric:         Mood and Affect: Mood is depressed.         Speech: Speech normal.         Behavior: Behavior normal.         LABS  No new labs      ASSESSMENT/PLAN  Diagnoses and all orders for this visit:    1. Current moderate episode of major depressive disorder without prior episode (CMS/HCC) (Primary)  -     buPROPion XL (Wellbutrin XL) 150 MG 24 hr tablet; Take 1 tablet by mouth Daily.  Dispense: 30 tablet; Refill: 0    Counseled patient regarding multimodal approach with healthy nutrition, healthy sleep, regular physical activity, social activities, counseling, and medications.  Reviewed medication options and common side effects. Patient would like to proceed with wellbutrin.  Advised some antidepression meds can take several weeks to see improvement in symptoms, and is important to take medication as prescribed.  Some antidepressants especially in younger populations can worsen depression symptoms.  If any thoughts of self harm or suicidal ideations, pt should contact our office for immediate evaluation or seek emergency care.    2. Chronic right-sided thoracic back pain  -     Ambulatory Referral to Physical Therapy Evaluate and treat (thoracic and low back pain with sciatica); " Strengthening, Stretching, ROM  -     Ambulatory Referral to Orthopedic Surgery    3. Chronic right-sided low back pain with right-sided sciatica  -     Ambulatory Referral to Physical Therapy Evaluate and treat (thoracic and low back pain with sciatica); Strengthening, Stretching, ROM  -     Ambulatory Referral to Orthopedic Surgery    Recommend heat therapy for back; can take tylenol arthritis or nsaids. OTC.      I discussed the patients findings and my recommendations with patient.  Patient was encouraged to keep me informed of any acute changes, lack of improvement, or any new concerning symptoms.  Patient voiced understanding of all instructions and denied further questions.      FOLLOW-UP  Return in about 4 weeks (around 12/11/2020), or if symptoms worsen or fail to improve, for Recheck.    Electronically signed by:    Lottie Denis, JEAN  11/13/2020    EMR Dragon/Transcription Disclaimer:  Much of this encounter note is an electronic transcription/translation of spoken language to printed text.  The electronic translation of spoken language may permit erroneous, or at times, nonsensical words or phrases to be inadvertently transcribed.  Although I have reviewed the note for such errors, some may still exist

## 2020-12-01 ENCOUNTER — HOSPITAL ENCOUNTER (OUTPATIENT)
Dept: PHYSICAL THERAPY | Facility: HOSPITAL | Age: 50
Setting detail: THERAPIES SERIES
Discharge: HOME | End: 2020-12-01

## 2020-12-01 DIAGNOSIS — M54.6 CHRONIC RIGHT-SIDED THORACIC BACK PAIN: ICD-10-CM

## 2020-12-01 DIAGNOSIS — M54.41 CHRONIC RIGHT-SIDED LOW BACK PAIN WITH RIGHT-SIDED SCIATICA: Primary | ICD-10-CM

## 2020-12-01 DIAGNOSIS — G89.29 CHRONIC RIGHT-SIDED THORACIC BACK PAIN: ICD-10-CM

## 2020-12-01 DIAGNOSIS — G89.29 CHRONIC RIGHT-SIDED LOW BACK PAIN WITH RIGHT-SIDED SCIATICA: Primary | ICD-10-CM

## 2020-12-01 PROCEDURE — 97161 PT EVAL LOW COMPLEX 20 MIN: CPT

## 2020-12-01 NOTE — THERAPY EVALUATION
"    Outpatient Physical Therapy Ortho Initial Evaluation  [facility]     Patient Name: Cassia Ochoa  : 1970  MRN: [id number]  Today's Date: 2020      Visit Date: 2020    There is no problem list on file for this patient.       Past Medical History:   Diagnosis Date   • Bilateral ovarian cysts    • Hashimoto's thyroiditis    • Hypothyroidism    • Urinary tract infection         Past Surgical History:   Procedure Laterality Date   • APPENDECTOMY     • HYSTERECTOMY         Visit Dx:     ICD-10-CM ICD-9-CM   1. Chronic right-sided low back pain with right-sided sciatica  M54.41 724.2    G89.29 724.3     338.29   2. Chronic right-sided thoracic back pain  M54.6 724.1    G89.29 338.29         Patient History     Row Name 2045 20          History    Chief Complaint  --  Difficulty Walking;Difficulty with daily activities;Joint stiffness;Joint swelling;Muscle tenderness;Muscle weakness;Numbness;Pain;Tightness;Tinglings  (Pended)   (Patient-Rptd)   -patient     Type of Pain  --  Back pain;Hip pain;Lower Extremity / Leg;Neck pain;Shoulder pain  (Pended)   (Patient-Rptd)   -patient     Date Current Problem(s) Began  --  98  (Pended)   (Patient-Rptd)   -patient     Brief Description of Current Complaint  Patient presents with chronic right-sided back pain that began >20 years ago after an injury.   She says she ran down the stairs and she slipped and fell hitting her back on a sharp edge and \"severed\" muscles attaching into right lumbar region.  No surgical intervention, but she did have PT at that time.   She says about every 4-5 years she experiences a reinjury in which she feels and hears something pop apart, but says it eventually heals back.  Has to take aleve to help manage. Has cold, pain, numbness and tingling down right leg.  Also pain in right thoracic region.  Intensity varies different regions of the right side.  Massage can help, but then leg feels weak.  She " reports as time goes on, she is having more pain with less activity.  -LF  Injury  (Pended)   (Patient-Rptd)   -patient     Patient/Caregiver Goals  --  Relieve pain  (Pended)   (Patient-Rptd)   -patient     Patient/Caregiver Goals Comment  minimize pain while remaining active  -LF  --     Hand Dominance  --  right-handed  (Pended)   (Patient-Rptd)   -patient     Occupation/sports/leisure activities  --  Housework yard work volunteering  (Pended)   (Patient-Rptd)   -patient     Patient seeing anyone else for problem(s)?  --  No  (Pended)   (Patient-Rptd)   -patient     How has patient tried to help current problem?  Has tried exercise including Yoga and walking, but says this causes significant increase in pain  -LF  --     Results of Clinical Tests  no recent imaging  -LF  --     Are you or can you be pregnant  --  No  (Pended)   (Patient-Rptd)   -patient        Pain     Pain Location  Back  -LF  --     Pain at Present  1  -LF  --     Pain at Best  1  -LF  --     Pain at Worst  7  -LF  --     Pain Description  Tightness;Discomfort;Shooting  -LF  --     What Performance Factors Make the Current Problem(s) WORSE?  increased activity, repetivie motion like mopping and raking  -LF  --     What Performance Factors Make the Current Problem(s) BETTER?  stop and rest, take aleve, soak in hot tub, hot pad  -LF  --        Fall Risk Assessment    Any falls in the past year:  --  No  (Pended)   (Patient-Rptd)   -patient        Services    Prior Rehab/Home Health Experiences  --  No  (Pended)   (Patient-Rptd)   -patient     Are you currently receiving Home Health services  --  No  (Pended)   (Patient-Rptd)   -patient     Do you plan to receive Home Health services in the near future  --  No  (Pended)   (Patient-Rptd)   -patient        Daily Activities    Primary Language  --  English  (Pended)   (Patient-Rptd)   -patient     Are you able to read  --  Yes  (Pended)   (Patient-Rptd)   -patient     Are you able to write  --  Yes   "(Pended)   (Patient-Rptd)   -patient     How does patient learn best?  --  Reading  (Pended)   (Patient-Rptd)   -patient     Barriers to learning  None  -LF  --     Pt Participated in POC and Goals  Yes  -LF  --        Safety    Are you being hurt, hit, or frightened by anyone at home or in your life?  --  No  (Pended)   (Patient-Rptd)   -patient     Are you being neglected by a caregiver  --  No  (Pended)   (Patient-Rptd)   -patient     Have you had any of the following issues with  --  Depression  (Pended)   (Patient-Rptd)   -patient       User Key  (r) = Recorded By, (t) = Taken By, (c) = Cosigned By    Initials Name Provider Type    LF Nicci Reilly PT Physical Therapist    patient Cassia Ochoa --          PT Ortho     Row Name 12/01/20 0845       Posture/Observations    Alignment Options  Lumbar lordosis  -LF    Lumbar lordosis  Decreased  -LF       Special Tests/Palpation    Special Tests/Palpation  Lumbar/SI  -LF       Lumbar/SI Special Tests    SLR (Neural Tension)  Bilateral:;Negative  -LF    Thigh Thrust/Posterior Shear (SI Dysfunction)  Bilateral:;Negative  -LF    NELA (hip vs. SI Dysfunction)  Bilateral:;Negative  -LF    Lumbar/SI Special Tests Comments  No change in RLE symptoms with CALEB and prone press-up.  She has tenderness right lumbar, SI, and gluteal regions with tightness R paravertebrals.  PA springing WNL's.  ASIS and PSIS level  -LF       General ROM    Head/Neck/Trunk  Trunk Extension;Trunk Flexion;Trunk Lt Lateral Flexion;Trunk Rt Lateral Flexion  -LF    GENERAL ROM COMMENTS  LE ROM and symmetrical including hip IR/ER  -LF       Head/Neck/Trunk    Trunk Extension AROM  WNL's  -LF    Trunk Flexion AROM  WNL's (\"pull low back and posterior legs)  -LF    Trunk Lt Lateral Flexion AROM  mild limitation with increased R-sided pain  -LF    Trunk Rt Lateral Flexion AROM  WNL  -LF       MMT (Manual Muscle Testing)    General MMT Comments  LE strength is 5/5 except hip ext 4/5 R, 4+/5 L;  " able to hold plank 10s (increased discomfort).  Completed single-leg heel raise x10 (reported easier on R)  -LF       Sensation    Light Touch  No apparent deficits  -LF       Flexibility    Flexibility Tested?  Lower Extremity  -LF       Lower Extremity Flexibility    Hamstrings  Bilateral:;WNL  -LF    Quadriceps  Bilateral:;WNL  -LF      User Key  (r) = Recorded By, (t) = Taken By, (c) = Cosigned By    Initials Name Provider Type    LF Nicci Reilly PT Physical Therapist                      Therapy Education  Education Details: HEP provided for SKTC, LTR, piriformis stretch, isometric abdominal/hipflex/abd/add  Given: HEP, Symptoms/condition management  Program: New  How Provided: Verbal, Demonstration, Written  Provided to: Patient  Level of Understanding: Teach back education performed, Verbalized, Demonstrated     PT OP Goals     Row Name 12/01/20 0823          PT Short Term Goals    STG Date to Achieve  12/22/20  -LF     STG 1  Patient to be independent with initial HEP for core stability and flexbility exercises  -LF     STG 1 Progress  New  -LF     STG 2  Patient to report 25% improvement in pain  -LF     STG 2 Progress  New  -LF        Long Term Goals    LTG Date to Achieve  01/12/21  -LF     LTG 1  Patient to demonstrate painfree lumbar ROM  -LF     LTG 1 Progress  New  -LF     LTG 2  Patient to report 50% decrease in radicular symptoms.  -LF     LTG 2 Progress  New  -LF     LTG 3  Modified Oswestry improved by at least 10%  -LF     LTG 3 Progress  New  -LF     LTG 4  Patient to report ability to walk 20 minutes without increased pain.  -LF     LTG 4 Progress  New  -LF        Time Calculation    PT Goal Re-Cert Due Date  03/01/21  -LF       User Key  (r) = Recorded By, (t) = Taken By, (c) = Cosigned By    Initials Name Provider Type    LF Nicci Reilly PT Physical Therapist          PT Assessment/Plan     Row Name 12/01/20 0863          PT Assessment    Functional Limitations  Limitations in  community activities;Limitation in home management;Performance in leisure activities;Performance in sport activities  -LF     Impairments  Posture;Range of motion;Pain;Muscle strength;Impaired flexibility  -LF     Assessment Comments  Patient presents with chronic back and leg pain related to an old injury.   She has pull and discomfort with lumbar flexion and sidebending, along with  hip and core weakness.  Will benefit from continued treatment to decrease pain and address deficits to allow patient to participate in regular exercise and other activities without increased pain for improved QOL  -LF     Please refer to paper survey for additional self-reported information  Yes  -LF     Rehab Potential  Fair  -LF     Patient/caregiver participated in establishment of treatment plan and goals  Yes  -LF     Patient would benefit from skilled therapy intervention  Yes  -LF        PT Plan    PT Frequency  2x/week  -LF     Planned CPT's?  PT EVAL LOW COMPLEXITY: 30390;PT THER PROC EA 15 MIN: 31866;PT THER ACT EA 15 MIN: 73468;PT MANUAL THERAPY EA 15 MIN: 19015;PT HOT/COLD PACK WC NONMCARE: 44261;PT TRACTION LUMBAR: 58794  -LF     PT Plan Comments  cont. 2x/week.  Consider trial of ASTYM  -LF       User Key  (r) = Recorded By, (t) = Taken By, (c) = Cosigned By    Initials Name Provider Type    LF Nicci Reilly, PT Physical Therapist            Outcome Measure Options: Modifed Owestry  Modified Oswestry  Modified Oswestry Score/Comments: 15/30      Time Calculation:     Start Time: 0845     Therapy Charges for Today     Code Description Service Date Service Provider Modifiers Qty    48068641454 HC PT EVAL LOW COMPLEXITY 4 12/1/2020 Nicci Reilly, PT GP 1          PT G-Codes  Outcome Measure Options: Modifed Owestry  Modified Oswestry Score/Comments: 15/30         Nicci Reilly PT  12/1/2020

## 2020-12-07 ENCOUNTER — HOSPITAL ENCOUNTER (OUTPATIENT)
Dept: PHYSICAL THERAPY | Facility: HOSPITAL | Age: 50
Setting detail: THERAPIES SERIES
Discharge: HOME | End: 2020-12-07

## 2020-12-07 DIAGNOSIS — G89.29 CHRONIC RIGHT-SIDED LOW BACK PAIN WITH RIGHT-SIDED SCIATICA: Primary | ICD-10-CM

## 2020-12-07 DIAGNOSIS — G89.29 CHRONIC RIGHT-SIDED THORACIC BACK PAIN: ICD-10-CM

## 2020-12-07 DIAGNOSIS — F32.1 CURRENT MODERATE EPISODE OF MAJOR DEPRESSIVE DISORDER WITHOUT PRIOR EPISODE (HCC): ICD-10-CM

## 2020-12-07 DIAGNOSIS — M54.6 CHRONIC RIGHT-SIDED THORACIC BACK PAIN: ICD-10-CM

## 2020-12-07 DIAGNOSIS — M54.41 CHRONIC RIGHT-SIDED LOW BACK PAIN WITH RIGHT-SIDED SCIATICA: Primary | ICD-10-CM

## 2020-12-07 PROCEDURE — 97140 MANUAL THERAPY 1/> REGIONS: CPT

## 2020-12-07 PROCEDURE — 97110 THERAPEUTIC EXERCISES: CPT

## 2020-12-07 RX ORDER — BUPROPION HYDROCHLORIDE 150 MG/1
150 TABLET ORAL DAILY
Qty: 30 TABLET | Refills: 0 | OUTPATIENT
Start: 2020-12-07

## 2020-12-11 ENCOUNTER — TELEMEDICINE (OUTPATIENT)
Dept: INTERNAL MEDICINE | Facility: CLINIC | Age: 50
End: 2020-12-11

## 2020-12-11 DIAGNOSIS — M54.42 CHRONIC BILATERAL LOW BACK PAIN WITH BILATERAL SCIATICA: Primary | ICD-10-CM

## 2020-12-11 DIAGNOSIS — G89.29 CHRONIC BILATERAL LOW BACK PAIN WITH BILATERAL SCIATICA: Primary | ICD-10-CM

## 2020-12-11 DIAGNOSIS — M54.41 CHRONIC BILATERAL LOW BACK PAIN WITH BILATERAL SCIATICA: Primary | ICD-10-CM

## 2020-12-11 DIAGNOSIS — F32.1 CURRENT MODERATE EPISODE OF MAJOR DEPRESSIVE DISORDER WITHOUT PRIOR EPISODE (HCC): ICD-10-CM

## 2020-12-11 PROCEDURE — 99213 OFFICE O/P EST LOW 20 MIN: CPT | Performed by: NURSE PRACTITIONER

## 2020-12-11 RX ORDER — TIZANIDINE 2 MG/1
2 TABLET ORAL EVERY 8 HOURS PRN
Qty: 30 TABLET | Refills: 0 | Status: SHIPPED | OUTPATIENT
Start: 2020-12-11 | End: 2021-03-05 | Stop reason: SDUPTHER

## 2020-12-11 RX ORDER — BUPROPION HYDROCHLORIDE 300 MG/1
300 TABLET ORAL DAILY
Qty: 30 TABLET | Refills: 3 | Status: SHIPPED | OUTPATIENT
Start: 2020-12-11 | End: 2021-01-18 | Stop reason: DRUGHIGH

## 2020-12-11 NOTE — PROGRESS NOTES
Chief Complaint   Patient presents with   • Depression   • Back Pain       History of Present Illness  50 y.o.female presents for depression and back pain follow up.  The use of a video visit has been reviewed with the patient and verbal informed consent has been obtained.    4 week follow depression; had got worse after death of both parents this year.  Depression  Visit Type: follow-up (4 week )  Patient presents with the following symptoms: depressed mood.  Patient is not experiencing: palpitations, shortness of breath, suicidal ideas and thoughts of death.  Frequency of symptoms: occasionally (improving symptoms)   Severity: mild   Compliance with medications:  % (started on wellbutrin last visit; doing better would like to try increased dose.)        Back Pain  This is a chronic problem. The current episode started more than 1 year ago. The problem occurs daily. The problem has been gradually improving since onset. The pain is present in the lumbar spine. The quality of the pain is described as aching and shooting. The pain is mild. The symptoms are aggravated by position. Pertinent negatives include no chest pain or fever. She has tried analgesics and home exercises (doing physical therapy) for the symptoms. The treatment provided mild relief.       Review of Systems   Constitutional: Negative for chills and fever.   Respiratory: Negative for shortness of breath.    Cardiovascular: Negative for chest pain, palpitations and leg swelling.   Musculoskeletal: Positive for back pain.   Psychiatric/Behavioral: Positive for depressed mood. Negative for self-injury and suicidal ideas.         [facility]  The following portions of the patient's history were reviewed and updated as appropriate: allergies, current medications, past family history, past medical history, past social history, past surgical history and problem list.     Past Medical History:   Diagnosis Date   • Bilateral ovarian cysts    • Hashimoto's  thyroiditis    • Hypothyroidism    • Urinary tract infection       Past Surgical History:   Procedure Laterality Date   • APPENDECTOMY     • HYSTERECTOMY        Allergies   Allergen Reactions   • Sulfa Antibiotics Anaphylaxis   • Codeine Other (See Comments)     Pt states crazy dreams      Social History     Socioeconomic History   • Marital status: Single   Tobacco Use   • Smoking status: Former Smoker     Packs/day: 1.00     Years: 20.00     Pack years: 20.00     Types: Cigarettes     Quit date:      Years since quittin.9   • Smokeless tobacco: Never Used   Substance and Sexual Activity   • Alcohol use: Yes     Comment: rare   • Drug use: No   • Sexual activity: Yes     Partners: Male     Family History   Problem Relation Age of Onset   • Hypertension Father    • Cancer Brother    • Mental illness Brother    • Thyroid disease Daughter             Current Outpatient Medications:   •  buPROPion XL (Wellbutrin XL) 150 MG 24 hr tablet, Take 1 tablet by mouth Daily., Disp: 30 tablet, Rfl: 0  •  estradiol (ESTRACE) 1 MG tablet, Take 1 tablet by mouth Daily., Disp: 30 tablet, Rfl: 11  •  fluticasone (FLONASE) 50 MCG/ACT nasal spray, fluticasone propionate 50 mcg/actuation nasal spray,suspension  Spray 1 spray every day by intranasal route for 30 days., Disp: , Rfl:   •  furosemide (LASIX) 20 MG tablet, TAKE 1 TABLET BY MOUTH TWICE DAILY (Patient taking differently: Take 20 mg by mouth As Needed.), Disp: 180 tablet, Rfl: 0  •  levothyroxine (Synthroid) 100 MCG tablet, Take 1 tablet by mouth Daily. On empty stomach, Disp: 30 tablet, Rfl: 5    VITALS:  Physical Exam  HENT:      Head: Normocephalic.   Pulmonary:      Effort: Pulmonary effort is normal. No respiratory distress.   Neurological:      Mental Status: She is alert and oriented to person, place, and time.   Psychiatric:         Mood and Affect: Mood normal.     cooking making cmas dinner for family on video.    LABS  No new  labs      ASSESSMENT/PLAN  Diagnoses and all orders for this visit:    1. Chronic bilateral low back pain with bilateral sciatica (Primary)  -     tiZANidine (ZANAFLEX) 2 MG tablet; Take 1 tablet by mouth Every 8 (Eight) Hours As Needed for Muscle Spasms.  Dispense: 30 tablet; Refill: 0  Cont physical therapy. Added muscle relaxant  2. Current moderate episode of major depressive disorder without prior episode (CMS/HCC)  -     buPROPion XL (WELLBUTRIN XL) 300 MG 24 hr tablet; Take 1 tablet by mouth Daily.  Dispense: 30 tablet; Refill: 3  Doing better; increased wellbutrin dose.      I discussed the patients findings and my recommendations with patient.  Patient was encouraged to keep me informed of any acute changes, lack of improvement, or any new concerning symptoms.  Patient voiced understanding of all instructions and denied further questions.      FOLLOW-UP  Return in about 3 months (around 3/11/2021), or if symptoms worsen or fail to improve, for Recheck depression.    Electronically signed by:    JEAN Castillo  12/11/2020    EMR Dragon/Transcription Disclaimer:  Much of this encounter note is an electronic transcription/translation of spoken language to printed text.  The electronic translation of spoken language may permit erroneous, or at times, nonsensical words or phrases to be inadvertently transcribed.  Although I have reviewed the note for such errors, some may still exist

## 2020-12-29 DIAGNOSIS — M54.41 CHRONIC BILATERAL LOW BACK PAIN WITH BILATERAL SCIATICA: ICD-10-CM

## 2020-12-29 DIAGNOSIS — G89.29 CHRONIC BILATERAL LOW BACK PAIN WITH BILATERAL SCIATICA: ICD-10-CM

## 2020-12-29 DIAGNOSIS — M54.42 CHRONIC BILATERAL LOW BACK PAIN WITH BILATERAL SCIATICA: ICD-10-CM

## 2020-12-29 RX ORDER — TIZANIDINE 2 MG/1
TABLET ORAL
Qty: 30 TABLET | Refills: 0 | OUTPATIENT
Start: 2020-12-29

## 2020-12-31 ENCOUNTER — LAB (OUTPATIENT)
Dept: LAB | Facility: HOSPITAL | Age: 50
End: 2020-12-31

## 2020-12-31 ENCOUNTER — LAB (OUTPATIENT)
Dept: ENDOCRINOLOGY | Facility: CLINIC | Age: 50
End: 2020-12-31

## 2020-12-31 DIAGNOSIS — E03.9 HYPOTHYROIDISM, UNSPECIFIED TYPE: Primary | ICD-10-CM

## 2020-12-31 DIAGNOSIS — E03.9 HYPOTHYROIDISM, UNSPECIFIED TYPE: ICD-10-CM

## 2020-12-31 LAB
T4 FREE SERPL-MCNC: 1.73 NG/DL (ref 0.93–1.7)
TSH SERPL DL<=0.05 MIU/L-ACNC: 0.53 UIU/ML (ref 0.27–4.2)

## 2020-12-31 PROCEDURE — 84439 ASSAY OF FREE THYROXINE: CPT

## 2020-12-31 PROCEDURE — 84443 ASSAY THYROID STIM HORMONE: CPT

## 2021-01-05 ENCOUNTER — OFFICE VISIT (OUTPATIENT)
Dept: ENDOCRINOLOGY | Facility: CLINIC | Age: 51
End: 2021-01-05

## 2021-01-05 VITALS
WEIGHT: 157.2 LBS | OXYGEN SATURATION: 97 % | BODY MASS INDEX: 26.84 KG/M2 | HEIGHT: 64 IN | HEART RATE: 84 BPM | SYSTOLIC BLOOD PRESSURE: 140 MMHG | DIASTOLIC BLOOD PRESSURE: 78 MMHG

## 2021-01-05 DIAGNOSIS — E03.9 HYPOTHYROIDISM, UNSPECIFIED TYPE: ICD-10-CM

## 2021-01-05 PROCEDURE — 99213 OFFICE O/P EST LOW 20 MIN: CPT | Performed by: INTERNAL MEDICINE

## 2021-01-05 RX ORDER — LEVOTHYROXINE SODIUM 0.1 MG/1
100 TABLET ORAL DAILY
Qty: 30 TABLET | Refills: 5 | Status: SHIPPED | OUTPATIENT
Start: 2021-01-05 | End: 2021-07-07

## 2021-01-05 NOTE — PROGRESS NOTES
"Chief Complaint   Patient presents with   • Follow-up   • Hypothyroidism        HPI   Mayito Ochoa is a 50 y.o. female had concerns including Follow-up and Hypothyroidism.     Patient does report that she started an antidepressant since her last visit.  She believes that this has helped significantly.  She does report  slight increased thirst over the past couple of weeks but denies any other new symptoms.    Regarding her hypothyroidism, patient continues on levothyroxine 100 mcg daily.  She completed labs last week.  Patient reports she is taking this first thing in the morning on empty stomach and denies any missed doses.  She denies any significant weight changes, changes in bowel habits, heat or cold intolerance.    The following portions of the patient's history were reviewed and updated as appropriate: allergies, current medications and past social history.    Review of Systems   Respiratory: Negative for cough and shortness of breath.    Cardiovascular: Negative for chest pain and palpitations.   Gastrointestinal: Negative for constipation and diarrhea.   Psychiatric/Behavioral: The patient is not nervous/anxious.         /78   Pulse 84   Ht 162.6 cm (64\")   Wt 71.3 kg (157 lb 3.2 oz)   SpO2 97%   BMI 26.98 kg/m²      Physical Exam      Constitutional:  well developed; well nourished  no acute distress  appears stated age   ENT/Thyroid: not examined   Eyes: Conjunctiva: clear   Respiratory:  breathing is unlabored  clear to auscultation bilaterally   Cardiovascular:  regular rate and rhythm   Chest:  Not performed.   Abdomen: soft, non-tender; no masses   : Not performed.   Musculoskeletal: Not performed   Skin: dry and warm   Neuro: mental status, speech normal   Psych: mood and affect are within normal limits       Labs/Imaging  Results for MAYITO OCHOA (MRN [id number]) as of 1/5/2021 13:13   Ref. Range 10/30/2020 09:52 12/31/2020 09:51   TSH Baseline Latest Ref Range: 0.270 - 4.200 " uIU/mL 0.330 0.528   Free T4 Latest Ref Range: 0.93 - 1.70 ng/dL 1.51 1.73 (H)       Diagnoses and all orders for this visit:    1. Hypothyroidism, unspecified type  -TSH at goal on 12/31/2020  -Patient currently taking levothyroxine 100 mcg daily  - patient is clinically euthyroid  -Discussed that mild elevation of free T4 could be secondary to timing of lab draw versus assay variability.  Discussed symptoms of hyperthyroidism in detail patient was instructed to contact the clinic should any of these develop.  -We will plan to continue levothyroxine at current dose, patient will contact the clinic in the interim between visits with any concerns.  -     levothyroxine (Synthroid) 100 MCG tablet; Take 1 tablet by mouth Daily. On empty stomach  Dispense: 30 tablet; Refill: 5      Return in about 6 months (around 7/5/2021). The patient was instructed to contact the clinic with any interval questions or concerns.    Louisa Sharp MD   Endocrinologist    Please note that portions of this document were completed with a voice recognition program. Efforts were made to edit the dictations, but occasionally words are mis-transcribed.

## 2021-01-18 ENCOUNTER — OFFICE VISIT (OUTPATIENT)
Dept: INTERNAL MEDICINE | Facility: CLINIC | Age: 51
End: 2021-01-18

## 2021-01-18 VITALS
HEART RATE: 84 BPM | SYSTOLIC BLOOD PRESSURE: 148 MMHG | OXYGEN SATURATION: 99 % | WEIGHT: 152 LBS | TEMPERATURE: 97.5 F | BODY MASS INDEX: 25.95 KG/M2 | DIASTOLIC BLOOD PRESSURE: 84 MMHG | HEIGHT: 64 IN

## 2021-01-18 DIAGNOSIS — I10 BENIGN ESSENTIAL HTN: ICD-10-CM

## 2021-01-18 DIAGNOSIS — F33.41 RECURRENT MAJOR DEPRESSIVE DISORDER, IN PARTIAL REMISSION (HCC): Primary | ICD-10-CM

## 2021-01-18 PROCEDURE — 99214 OFFICE O/P EST MOD 30 MIN: CPT | Performed by: NURSE PRACTITIONER

## 2021-01-18 RX ORDER — BUPROPION HYDROCHLORIDE 150 MG/1
150 TABLET ORAL DAILY
Qty: 30 TABLET | Refills: 2 | Status: SHIPPED | OUTPATIENT
Start: 2021-01-18 | End: 2021-04-02

## 2021-01-18 NOTE — PATIENT INSTRUCTIONS
"DASH Eating Plan  DASH stands for \"Dietary Approaches to Stop Hypertension.\" The DASH eating plan is a healthy eating plan that has been shown to reduce high blood pressure (hypertension). It may also reduce your risk for type 2 diabetes, heart disease, and stroke. The DASH eating plan may also help with weight loss.  What are tips for following this plan?    General guidelines  · Avoid eating more than 2,300 mg (milligrams) of salt (sodium) a day. If you have hypertension, you may need to reduce your sodium intake to 1,500 mg a day.  · Limit alcohol intake to no more than 1 drink a day for nonpregnant women and 2 drinks a day for men. One drink equals 12 oz of beer, 5 oz of wine, or 1½ oz of hard liquor.  · Work with your health care provider to maintain a healthy body weight or to lose weight. Ask what an ideal weight is for you.  · Get at least 30 minutes of exercise that causes your heart to beat faster (aerobic exercise) most days of the week. Activities may include walking, swimming, or biking.  · Work with your health care provider or diet and nutrition specialist (dietitian) to adjust your eating plan to your individual calorie needs.  Reading food labels    · Check food labels for the amount of sodium per serving. Choose foods with less than 5 percent of the Daily Value of sodium. Generally, foods with less than 300 mg of sodium per serving fit into this eating plan.  · To find whole grains, look for the word \"whole\" as the first word in the ingredient list.  Shopping  · Buy products labeled as \"low-sodium\" or \"no salt added.\"  · Buy fresh foods. Avoid canned foods and premade or frozen meals.  Cooking  · Avoid adding salt when cooking. Use salt-free seasonings or herbs instead of table salt or sea salt. Check with your health care provider or pharmacist before using salt substitutes.  · Do not collado foods. Cook foods using healthy methods such as baking, boiling, grilling, and broiling instead.  · Cook with " heart-healthy oils, such as olive, canola, soybean, or sunflower oil.  Meal planning  · Eat a balanced diet that includes:  ? 5 or more servings of fruits and vegetables each day. At each meal, try to fill half of your plate with fruits and vegetables.  ? Up to 6-8 servings of whole grains each day.  ? Less than 6 oz of lean meat, poultry, or fish each day. A 3-oz serving of meat is about the same size as a deck of cards. One egg equals 1 oz.  ? 2 servings of low-fat dairy each day.  ? A serving of nuts, seeds, or beans 5 times each week.  ? Heart-healthy fats. Healthy fats called Omega-3 fatty acids are found in foods such as flaxseeds and coldwater fish, like sardines, salmon, and mackerel.  · Limit how much you eat of the following:  ? Canned or prepackaged foods.  ? Food that is high in trans fat, such as fried foods.  ? Food that is high in saturated fat, such as fatty meat.  ? Sweets, desserts, sugary drinks, and other foods with added sugar.  ? Full-fat dairy products.  · Do not salt foods before eating.  · Try to eat at least 2 vegetarian meals each week.  · Eat more home-cooked food and less restaurant, buffet, and fast food.  · When eating at a restaurant, ask that your food be prepared with less salt or no salt, if possible.  What foods are recommended?  The items listed may not be a complete list. Talk with your dietitian about what dietary choices are best for you.  Grains  Whole-grain or whole-wheat bread. Whole-grain or whole-wheat pasta. Brown rice. Oatmeal. Quinoa. Bulgur. Whole-grain and low-sodium cereals. Shazia bread. Low-fat, low-sodium crackers. Whole-wheat flour tortillas.  Vegetables  Fresh or frozen vegetables (raw, steamed, roasted, or grilled). Low-sodium or reduced-sodium tomato and vegetable juice. Low-sodium or reduced-sodium tomato sauce and tomato paste. Low-sodium or reduced-sodium canned vegetables.  Fruits  All fresh, dried, or frozen fruit. Canned fruit in natural juice (without  added sugar).  Meat and other protein foods  Skinless chicken or turkey. Ground chicken or turkey. Pork with fat trimmed off. Fish and seafood. Egg whites. Dried beans, peas, or lentils. Unsalted nuts, nut butters, and seeds. Unsalted canned beans. Lean cuts of beef with fat trimmed off. Low-sodium, lean deli meat.  Dairy  Low-fat (1%) or fat-free (skim) milk. Fat-free, low-fat, or reduced-fat cheeses. Nonfat, low-sodium ricotta or cottage cheese. Low-fat or nonfat yogurt. Low-fat, low-sodium cheese.  Fats and oils  Soft margarine without trans fats. Vegetable oil. Low-fat, reduced-fat, or light mayonnaise and salad dressings (reduced-sodium). Canola, safflower, olive, soybean, and sunflower oils. Avocado.  Seasoning and other foods  Herbs. Spices. Seasoning mixes without salt. Unsalted popcorn and pretzels. Fat-free sweets.  What foods are not recommended?  The items listed may not be a complete list. Talk with your dietitian about what dietary choices are best for you.  Grains  Baked goods made with fat, such as croissants, muffins, or some breads. Dry pasta or rice meal packs.  Vegetables  Creamed or fried vegetables. Vegetables in a cheese sauce. Regular canned vegetables (not low-sodium or reduced-sodium). Regular canned tomato sauce and paste (not low-sodium or reduced-sodium). Regular tomato and vegetable juice (not low-sodium or reduced-sodium). Pickles. Olives.  Fruits  Canned fruit in a light or heavy syrup. Fried fruit. Fruit in cream or butter sauce.  Meat and other protein foods  Fatty cuts of meat. Ribs. Fried meat. Mckeon. Sausage. Bologna and other processed lunch meats. Salami. Fatback. Hotdogs. Bratwurst. Salted nuts and seeds. Canned beans with added salt. Canned or smoked fish. Whole eggs or egg yolks. Chicken or turkey with skin.  Dairy  Whole or 2% milk, cream, and half-and-half. Whole or full-fat cream cheese. Whole-fat or sweetened yogurt. Full-fat cheese. Nondairy creamers. Whipped toppings.  Processed cheese and cheese spreads.  Fats and oils  Butter. Stick margarine. Lard. Shortening. Ghee. [place] fat. Tropical oils, such as coconut, palm kernel, or palm oil.  Seasoning and other foods  Salted popcorn and pretzels. Onion salt, garlic salt, seasoned salt, table salt, and sea salt. Worcestershire sauce. Tartar sauce. Barbecue sauce. Teriyaki sauce. Soy sauce, including reduced-sodium. Steak sauce. Canned and packaged gravies. Fish sauce. Oyster sauce. Cocktail sauce. Horseradish that you find on the shelf. Ketchup. Mustard. Meat flavorings and tenderizers. Bouillon cubes. Hot sauce and Tabasco sauce. Premade or packaged marinades. Premade or packaged taco seasonings. Relishes. Regular salad dressings.  Where to find more information:  · National Heart, Lung, and Blood [place]: www.nhlbi.nih.gov  · American Heart Association: www.heart.org  Summary  · The DASH eating plan is a healthy eating plan that has been shown to reduce high blood pressure (hypertension). It may also reduce your risk for type 2 diabetes, heart disease, and stroke.  · With the DASH eating plan, you should limit salt (sodium) intake to 2,300 mg a day. If you have hypertension, you may need to reduce your sodium intake to 1,500 mg a day.  · When on the DASH eating plan, aim to eat more fresh fruits and vegetables, whole grains, lean proteins, low-fat dairy, and heart-healthy fats.  · Work with your health care provider or diet and nutrition specialist (dietitian) to adjust your eating plan to your individual calorie needs.  This information is not intended to replace advice given to you by your health care provider. Make sure you discuss any questions you have with your health care provider.  Document Revised: 11/30/2018 Document Reviewed: 12/11/2017  Elsevier Patient Education © 2020 [employer] Inc[employer]

## 2021-01-18 NOTE — PROGRESS NOTES
Chief Complaint   Patient presents with   • Hypertension     Pt states 159/97 last week, stopped wellbutrin and BP has lowered since and wants to discuss alternatives   • Depression       History of Present Illness    50 y.o.female presents for htn and depression.  Had been taking wellbutrin since mid november for depression; increased dose mid dec.  Feels much better for wellbutrin; working less sadness and less anxiety. A few days ago noticed high blood pressure 150-160's. Read that wellbutrin can cause elevated BP, so she stopped taking the wellbutrin. BP better but anxiety depression returning.     ROS: any positive referenced above.   Other ROS negative.      PMSFH  The following portions of the patient's history were reviewed and updated as appropriate: allergies, current medications, past family history, past medical history, past social history, past surgical history and problem list.     Past Medical History:   Diagnosis Date   • Bilateral ovarian cysts    • Hashimoto's thyroiditis    • Hypothyroidism    • Urinary tract infection       Allergies   Allergen Reactions   • Sulfa Antibiotics Anaphylaxis   • Codeine Other (See Comments)     Pt states crazy dreams      Social History     Tobacco Use   • Smoking status: Former Smoker     Packs/day: 1.00     Years: 20.00     Pack years: 20.00     Types: Cigarettes     Quit date:      Years since quittin.0   • Smokeless tobacco: Never Used   Substance Use Topics   • Alcohol use: Yes     Comment: rare   • Drug use: No         Current Outpatient Medications:   •  estradiol (ESTRACE) 1 MG tablet, Take 1 tablet by mouth Daily., Disp: 30 tablet, Rfl: 11  •  fluticasone (FLONASE) 50 MCG/ACT nasal spray, fluticasone propionate 50 mcg/actuation nasal spray,suspension  Spray 1 spray every day by intranasal route for 30 days., Disp: , Rfl:   •  furosemide (LASIX) 20 MG tablet, TAKE 1 TABLET BY MOUTH TWICE DAILY (Patient taking differently: Take 20 mg by mouth As  "Needed.), Disp: 180 tablet, Rfl: 0  •  levothyroxine (Synthroid) 100 MCG tablet, Take 1 tablet by mouth Daily. On empty stomach, Disp: 30 tablet, Rfl: 5  •  tiZANidine (ZANAFLEX) 2 MG tablet, Take 1 tablet by mouth Every 8 (Eight) Hours As Needed for Muscle Spasms., Disp: 30 tablet, Rfl: 0  •  buPROPion XL (Wellbutrin XL) 150 MG 24 hr tablet, Take 1 tablet by mouth Daily., Disp: 30 tablet, Rfl: 2    VITALS:  /84   Pulse 84   Temp 97.5 °F (36.4 °C)   Ht 162.6 cm (64\")   Wt 68.9 kg (152 lb)   SpO2 99%   Breastfeeding No   BMI 26.09 kg/m²     Physical Exam  HENT:      Head: Normocephalic.   Cardiovascular:      Rate and Rhythm: Normal rate and regular rhythm.      Heart sounds: Normal heart sounds.   Pulmonary:      Effort: Pulmonary effort is normal. No respiratory distress.      Breath sounds: Normal breath sounds.   Neurological:      General: No focal deficit present.      Mental Status: She is alert and oriented to person, place, and time.   Psychiatric:         Mood and Affect: Mood normal.         Result Review :            Assessment and Plan    Diagnoses and all orders for this visit:    1. Recurrent major depressive disorder, in partial remission (CMS/HCC) (Primary)  -     buPROPion XL (Wellbutrin XL) 150 MG 24 hr tablet; Take 1 tablet by mouth Daily.  Dispense: 30 tablet; Refill: 2  Decreased wellbutrin dose  2. Benign essential HTN  DASH diet low sodium diet; goal <130/80. Decreased wellbutrin. If no improvement in bp she is to let me know.        Follow Up   Return in about 8 weeks (around 3/15/2021), or if symptoms worsen or fail to improve.      I discussed the patients findings and my recommendations with patient.  Patient was encouraged to keep me informed of any acute changes, lack of improvement, or any new concerning symptoms.  Patient voiced understanding of all instructions and denied further questions.    Electronically signed by:    JEAN Castillo  01/18/2021    EMR " Dragon/Transcription Disclaimer:  Much of this encounter note is an electronic transcription/translation of spoken language to printed text.  The electronic translation of spoken language may permit erroneous, or at times, nonsensical words or phrases to be inadvertently transcribed.  Although I have reviewed the note for such errors, some may still exist

## 2021-02-12 ENCOUNTER — DOCUMENTATION (OUTPATIENT)
Dept: PHYSICAL THERAPY | Facility: HOSPITAL | Age: 51
End: 2021-02-12

## 2021-02-12 DIAGNOSIS — G89.29 CHRONIC RIGHT-SIDED LOW BACK PAIN WITH RIGHT-SIDED SCIATICA: Primary | ICD-10-CM

## 2021-02-12 DIAGNOSIS — M54.6 CHRONIC RIGHT-SIDED THORACIC BACK PAIN: ICD-10-CM

## 2021-02-12 DIAGNOSIS — M54.41 CHRONIC RIGHT-SIDED LOW BACK PAIN WITH RIGHT-SIDED SCIATICA: Primary | ICD-10-CM

## 2021-02-12 DIAGNOSIS — G89.29 CHRONIC RIGHT-SIDED THORACIC BACK PAIN: ICD-10-CM

## 2021-02-12 NOTE — THERAPY DISCHARGE NOTE
Outpatient Physical Therapy Discharge Summary         Patient Name: Cassia Ochoa  : 1970  MRN: [id number]    Today's Date: 2021    Visit Dx:    ICD-10-CM ICD-9-CM   1. Chronic right-sided low back pain with right-sided sciatica  M54.41 724.2    G89.29 724.3     338.29   2. Chronic right-sided thoracic back pain  M54.6 724.1    G89.29 338.29           OP PT Discharge Summary  Date of Discharge: 21  Outcomes Achieved: Unable to make functional progress toward goals at this time  Discharge Instructions/Additional Comments: Patient was seen for evaluation and 1 follow-up visit for treamtent of her back pain.  HEP had been issued and reviewed.  She cancelled her last 2 scheduled appointments, and no others scheduled.  She is discharged due to lapse in treatment.              Nicci Reilly, PT  2021

## 2021-03-05 DIAGNOSIS — M54.42 CHRONIC BILATERAL LOW BACK PAIN WITH BILATERAL SCIATICA: ICD-10-CM

## 2021-03-05 DIAGNOSIS — M54.41 CHRONIC BILATERAL LOW BACK PAIN WITH BILATERAL SCIATICA: ICD-10-CM

## 2021-03-05 DIAGNOSIS — G89.29 CHRONIC BILATERAL LOW BACK PAIN WITH BILATERAL SCIATICA: ICD-10-CM

## 2021-03-05 RX ORDER — TIZANIDINE 2 MG/1
2 TABLET ORAL EVERY 8 HOURS PRN
Qty: 30 TABLET | Refills: 5 | Status: SHIPPED | OUTPATIENT
Start: 2021-03-05 | End: 2022-06-01

## 2021-04-02 ENCOUNTER — OFFICE VISIT (OUTPATIENT)
Dept: INTERNAL MEDICINE | Facility: CLINIC | Age: 51
End: 2021-04-02

## 2021-04-02 VITALS
OXYGEN SATURATION: 98 % | SYSTOLIC BLOOD PRESSURE: 158 MMHG | DIASTOLIC BLOOD PRESSURE: 82 MMHG | HEART RATE: 88 BPM | TEMPERATURE: 97.7 F | WEIGHT: 156.3 LBS | BODY MASS INDEX: 26.83 KG/M2

## 2021-04-02 DIAGNOSIS — I10 ESSENTIAL HYPERTENSION: ICD-10-CM

## 2021-04-02 DIAGNOSIS — F43.23 ADJUSTMENT REACTION WITH ANXIETY AND DEPRESSION: Primary | ICD-10-CM

## 2021-04-02 DIAGNOSIS — F33.1 MODERATE EPISODE OF RECURRENT MAJOR DEPRESSIVE DISORDER (HCC): ICD-10-CM

## 2021-04-02 PROCEDURE — 99214 OFFICE O/P EST MOD 30 MIN: CPT | Performed by: NURSE PRACTITIONER

## 2021-04-02 RX ORDER — BUPROPION HYDROCHLORIDE 300 MG/1
300 TABLET ORAL DAILY
COMMUNITY
Start: 2021-03-03 | End: 2021-04-03 | Stop reason: SDUPTHER

## 2021-04-02 RX ORDER — AZELASTINE 1 MG/ML
SPRAY, METERED NASAL EVERY 12 HOURS SCHEDULED
COMMUNITY
End: 2021-07-07

## 2021-04-02 NOTE — PROGRESS NOTES
Chief Complaint   Patient presents with   • Hypertension   • Follow-up   • Depression       History of Present Illness    50 y.o.female presents for htn and depression follow up.  HTN; bp has been elevated 140's for few months. Had recommended DASH diet lifestyle changes in past, but pt reports has not done any diet exercise changes. No chest pain vision changes or headaches.  Depression has been bothering her more; chronic onset months. Started on wellbutrin and thought this was contributing to her increased BP. She tried doing the 150mg dose of wellbutrin but had worsening depression and had to go back to taking 300md daily on wellbutrin. Recently did dna ancestry testing and discovered her father who recently  in his 90's was not her biological father; and her mom also recently passed. Pt has been dealing with some feeling of dell loss of identity. Would like to see a counselor who can help with this.    Review of Systems   Constitutional: Negative for fatigue, unexpected weight gain and unexpected weight loss.   Eyes: Negative for blurred vision and visual disturbance.   Respiratory: Negative for cough and shortness of breath.    Cardiovascular: Negative for chest pain, palpitations and leg swelling.   Genitourinary: Negative for difficulty urinating.   Neurological: Negative for dizziness, syncope, light-headedness and headache.   Psychiatric/Behavioral: Positive for depressed mood and stress. Negative for self-injury. The patient is nervous/anxious.          [facility]  The following portions of the patient's history were reviewed and updated as appropriate: allergies, current medications, past family history, past medical history, past social history, past surgical history and problem list.     Past Medical History:   Diagnosis Date   • Bilateral ovarian cysts    • Hashimoto's thyroiditis    • Hypothyroidism    • Urinary tract infection       Allergies   Allergen Reactions   • Sulfa Antibiotics Anaphylaxis    • Codeine Other (See Comments)     Pt states crazy dreams      Social History     Tobacco Use   • Smoking status: Former Smoker     Packs/day: 1.00     Years: 20.00     Pack years: 20.00     Types: Cigarettes     Quit date: 2016     Years since quittin.2   • Smokeless tobacco: Never Used   Substance Use Topics   • Alcohol use: Yes     Comment: rare   • Drug use: No         Current Outpatient Medications:   •  azelastine (ASTELIN) 0.1 % nasal spray, Every 12 (Twelve) Hours., Disp: , Rfl:   •  buPROPion XL (WELLBUTRIN XL) 300 MG 24 hr tablet, Take 300 mg by mouth Daily., Disp: , Rfl:   •  estradiol (ESTRACE) 1 MG tablet, Take 1 tablet by mouth Daily., Disp: 30 tablet, Rfl: 11  •  fluticasone (FLONASE) 50 MCG/ACT nasal spray, fluticasone propionate 50 mcg/actuation nasal spray,suspension  Spray 1 spray every day by intranasal route for 30 days., Disp: , Rfl:   •  furosemide (LASIX) 20 MG tablet, TAKE 1 TABLET BY MOUTH TWICE DAILY (Patient taking differently: Take 20 mg by mouth As Needed.), Disp: 180 tablet, Rfl: 0  •  levothyroxine (Synthroid) 100 MCG tablet, Take 1 tablet by mouth Daily. On empty stomach, Disp: 30 tablet, Rfl: 5  •  tiZANidine (ZANAFLEX) 2 MG tablet, Take 1 tablet by mouth Every 8 (Eight) Hours As Needed for Muscle Spasms., Disp: 30 tablet, Rfl: 5    VITALS:  /82   Pulse 88   Temp 97.7 °F (36.5 °C)   Wt 70.9 kg (156 lb 4.8 oz)   SpO2 98%   Breastfeeding No   BMI 26.83 kg/m²     Physical Exam  HENT:      Head: Normocephalic.   Eyes:      Extraocular Movements: Extraocular movements intact.      Pupils: Pupils are equal, round, and reactive to light.   Cardiovascular:      Rate and Rhythm: Normal rate and regular rhythm.      Heart sounds: Normal heart sounds.   Pulmonary:      Effort: Pulmonary effort is normal. No respiratory distress.      Breath sounds: Normal breath sounds.   Musculoskeletal:      Cervical back: Normal range of motion.   Neurological:      Mental Status: She is  alert.   Psychiatric:         Mood and Affect: Mood is depressed.         Speech: Speech normal.           Assessment and Plan    Diagnoses and all orders for this visit:    1. Adjustment reaction with anxiety and depression (Primary)  -     Ambulatory Referral to Behavioral Health  -     buPROPion XL (WELLBUTRIN XL) 300 MG 24 hr tablet; Take 1 tablet by mouth Daily.  Dispense: 30 tablet; Refill: 5    2. Moderate episode of recurrent major depressive disorder (CMS/HCC)  -     buPROPion XL (WELLBUTRIN XL) 300 MG 24 hr tablet; Take 1 tablet by mouth Daily.  Dispense: 30 tablet; Refill: 5    3. Essential hypertension  DASH diet low sodium diet and increased exercise recommended.   Discussed med options; pt declines antihypertensive at this time. She says will try to do lifestyle changes. Goal BP <130/80.        Follow Up  Needs following of her depression in next 6-8 weeks. As long as gets set up with  then, she can see me in 6 months. If not set up with BH by then schedule FU with me 6-8 weeks.  Return in about 6 months (around 10/2/2021), or if symptoms worsen or fail to improve, for Recheck.      I discussed the patients findings and my recommendations with patient.  Patient was encouraged to keep me informed of any acute changes, lack of improvement, or any new concerning symptoms.  Patient voiced understanding of all instructions and denied further questions.    Electronically signed by:    JEAN Castillo  04/02/2021    EMR Dragon/Transcription Disclaimer:  Much of this encounter note is an electronic transcription/translation of spoken language to printed text.  The electronic translation of spoken language may permit erroneous, or at times, nonsensical words or phrases to be inadvertently transcribed.  Although I have reviewed the note for such errors, some may still exist

## 2021-04-02 NOTE — PATIENT INSTRUCTIONS
"https://www.nhlbi.nih.gov/files/docs/public/heart/dash_brief.pdf\">   DASH Eating Plan  DASH stands for Dietary Approaches to Stop Hypertension. The DASH eating plan is a healthy eating plan that has been shown to:  · Reduce high blood pressure (hypertension).  · Reduce your risk for type 2 diabetes, heart disease, and stroke.  · Help with weight loss.  What are tips for following this plan?  Reading food labels  · Check food labels for the amount of salt (sodium) per serving. Choose foods with less than 5 percent of the Daily Value of sodium. Generally, foods with less than 300 milligrams (mg) of sodium per serving fit into this eating plan.  · To find whole grains, look for the word \"whole\" as the first word in the ingredient list.  Shopping  · Buy products labeled as \"low-sodium\" or \"no salt added.\"  · Buy fresh foods. Avoid canned foods and pre-made or frozen meals.  Cooking  · Avoid adding salt when cooking. Use salt-free seasonings or herbs instead of table salt or sea salt. Check with your health care provider or pharmacist before using salt substitutes.  · Do not collado foods. Cook foods using healthy methods such as baking, boiling, grilling, roasting, and broiling instead.  · Cook with heart-healthy oils, such as olive, canola, avocado, soybean, or sunflower oil.  Meal planning    · Eat a balanced diet that includes:  ? 4 or more servings of fruits and 4 or more servings of vegetables each day. Try to fill one-half of your plate with fruits and vegetables.  ? 6-8 servings of whole grains each day.  ? Less than 6 oz (170 g) of lean meat, poultry, or fish each day. A 3-oz (85-g) serving of meat is about the same size as a deck of cards. One egg equals 1 oz (28 g).  ? 2-3 servings of low-fat dairy each day. One serving is 1 cup (237 mL).  ? 1 serving of nuts, seeds, or beans 5 times each week.  ? 2-3 servings of heart-healthy fats. Healthy fats called omega-3 fatty acids are found in foods such as walnuts, " flaxseeds, fortified milks, and eggs. These fats are also found in cold-water fish, such as sardines, salmon, and mackerel.  · Limit how much you eat of:  ? Canned or prepackaged foods.  ? Food that is high in trans fat, such as some fried foods.  ? Food that is high in saturated fat, such as fatty meat.  ? Desserts and other sweets, sugary drinks, and other foods with added sugar.  ? Full-fat dairy products.  · Do not salt foods before eating.  · Do not eat more than 4 egg yolks a week.  · Try to eat at least 2 vegetarian meals a week.  · Eat more home-cooked food and less restaurant, buffet, and fast food.  Lifestyle  · When eating at a restaurant, ask that your food be prepared with less salt or no salt, if possible.  · If you drink alcohol:  ? Limit how much you use to:  § 0-1 drink a day for women who are not pregnant.  § 0-2 drinks a day for men.  ? Be aware of how much alcohol is in your drink. In the [place] one drink equals one 12 oz bottle of beer (355 mL), one 5 oz glass of wine (148 mL), or one 1½ oz glass of hard liquor (44 mL).  General information  · Avoid eating more than 2,300 mg of salt a day. If you have hypertension, you may need to reduce your sodium intake to 1,500 mg a day.  · Work with your health care provider to maintain a healthy body weight or to lose weight. Ask what an ideal weight is for you.  · Get at least 30 minutes of exercise that causes your heart to beat faster (aerobic exercise) most days of the week. Activities may include walking, swimming, or biking.  · Work with your health care provider or dietitian to adjust your eating plan to your individual calorie needs.  What foods should I eat?  Fruits  All fresh, dried, or frozen fruit. Canned fruit in natural juice (without added sugar).  Vegetables  Fresh or frozen vegetables (raw, steamed, roasted, or grilled). Low-sodium or reduced-sodium tomato and vegetable juice. Low-sodium or reduced-sodium tomato sauce and tomato paste.  Low-sodium or reduced-sodium canned vegetables.  Grains  Whole-grain or whole-wheat bread. Whole-grain or whole-wheat pasta. Brown rice. Oatmeal. Quinoa. Bulgur. Whole-grain and low-sodium cereals. Shazia bread. Low-fat, low-sodium crackers. Whole-wheat flour tortillas.  Meats and other proteins  Skinless chicken or turkey. Ground chicken or turkey. Pork with fat trimmed off. Fish and seafood. Egg whites. Dried beans, peas, or lentils. Unsalted nuts, nut butters, and seeds. Unsalted canned beans. Lean cuts of beef with fat trimmed off. Low-sodium, lean precooked or cured meat, such as sausages or meat loaves.  Dairy  Low-fat (1%) or fat-free (skim) milk. Reduced-fat, low-fat, or fat-free cheeses. Nonfat, low-sodium ricotta or cottage cheese. Low-fat or nonfat yogurt. Low-fat, low-sodium cheese.  Fats and oils  Soft margarine without trans fats. Vegetable oil. Reduced-fat, low-fat, or light mayonnaise and salad dressings (reduced-sodium). Canola, safflower, olive, avocado, soybean, and sunflower oils. Avocado.  Seasonings and condiments  Herbs. Spices. Seasoning mixes without salt.  Other foods  Unsalted popcorn and pretzels. Fat-free sweets.  The items listed above may not be a complete list of foods and beverages you can eat. Contact a dietitian for more information.  What foods should I avoid?  Fruits  Canned fruit in a light or heavy syrup. Fried fruit. Fruit in cream or butter sauce.  Vegetables  Creamed or fried vegetables. Vegetables in a cheese sauce. Regular canned vegetables (not low-sodium or reduced-sodium). Regular canned tomato sauce and paste (not low-sodium or reduced-sodium). Regular tomato and vegetable juice (not low-sodium or reduced-sodium). Pickles. Olives.  Grains  Baked goods made with fat, such as croissants, muffins, or some breads. Dry pasta or rice meal packs.  Meats and other proteins  Fatty cuts of meat. Ribs. Fried meat. Mckeon. Bologna, salami, and other precooked or cured meats, such as  sausages or meat loaves. Fat from the back of a pig (fatback). Bratwurst. Salted nuts and seeds. Canned beans with added salt. Canned or smoked fish. Whole eggs or egg yolks. Chicken or turkey with skin.  Dairy  Whole or 2% milk, cream, and half-and-half. Whole or full-fat cream cheese. Whole-fat or sweetened yogurt. Full-fat cheese. Nondairy creamers. Whipped toppings. Processed cheese and cheese spreads.  Fats and oils  Butter. Stick margarine. Lard. Shortening. Ghee. Mckeon fat. Tropical oils, such as coconut, palm kernel, or palm oil.  Seasonings and condiments  Onion salt, garlic salt, seasoned salt, table salt, and sea salt. Worcestershire sauce. Tartar sauce. Barbecue sauce. Teriyaki sauce. Soy sauce, including reduced-sodium. Steak sauce. Canned and packaged gravies. Fish sauce. Oyster sauce. Cocktail sauce. Store-bought horseradish. Ketchup. Mustard. Meat flavorings and tenderizers. Bouillon cubes. Hot sauces. Pre-made or packaged marinades. Pre-made or packaged taco seasonings. Relishes. Regular salad dressings.  Other foods  Salted popcorn and pretzels.  The items listed above may not be a complete list of foods and beverages you should avoid. Contact a dietitian for more information.  Where to find more information  · National Heart, Lung, and Blood [place]: www.nhlbi.nih.gov  · American Heart Association: www.heart.org  · Academy of Nutrition and Dietetics: www.eatright.org  · National Kidney Foundation: www.kidney.org  Summary  · The DASH eating plan is a healthy eating plan that has been shown to reduce high blood pressure (hypertension). It may also reduce your risk for type 2 diabetes, heart disease, and stroke.  · When on the DASH eating plan, aim to eat more fresh fruits and vegetables, whole grains, lean proteins, low-fat dairy, and heart-healthy fats.  · With the DASH eating plan, you should limit salt (sodium) intake to 2,300 mg a day. If you have hypertension, you may need to reduce your  sodium intake to 1,500 mg a day.  · Work with your health care provider or dietitian to adjust your eating plan to your individual calorie needs.  This information is not intended to replace advice given to you by your health care provider. Make sure you discuss any questions you have with your health care provider.  Document Revised: 11/20/2020 Document Reviewed: 11/20/2020  [employer] Patient Education © 2021 [employer]vier Inc.

## 2021-04-03 RX ORDER — BUPROPION HYDROCHLORIDE 300 MG/1
300 TABLET ORAL DAILY
Qty: 30 TABLET | Refills: 5 | Status: SHIPPED | OUTPATIENT
Start: 2021-04-03 | End: 2021-07-07

## 2021-04-07 NOTE — TELEPHONE ENCOUNTER
Caller: [facility] [address] - [phone] PH - [phone] FX    Relationship: Pharmacy    Best call back number: [phone]    Medication needed:   furosemide (LASIX) 20 MG tablet    When do you need the refill by: PHARMACY IS UNSURE BECAUSE THEY ARE A NEW PHARMACY FOR THE PT    What additional details did the patient provide when requesting the medication:     Does the patient have less than a 3 day supply:  [] Yes  [] No    What is the patient's preferred pharmacy: [id number] - [place], [place] - [address] [place] [address]  AT [facility] - [phone] [facility] [phone] FX<br>[facility] - [place], [place] - [address] [facility] - [phone] PH - [phone] FX

## 2021-04-07 NOTE — TELEPHONE ENCOUNTER
Last Office Visit: 4/2/21  Next Office Visit:    Labs completed in past 6 months? yes  Labs completed in past year? yes    Last Refill Date:8/7/20  Quantity:180  Refills:0    Pharmacy:     Please review pended refill request for any changes needed on refills or quantities. Thank you!

## 2021-04-08 RX ORDER — FUROSEMIDE 20 MG/1
20 TABLET ORAL 2 TIMES DAILY
Qty: 180 TABLET | Refills: 1 | Status: SHIPPED | OUTPATIENT
Start: 2021-04-08 | End: 2022-11-21

## 2021-05-04 ENCOUNTER — TELEMEDICINE (OUTPATIENT)
Dept: PSYCHIATRY | Facility: CLINIC | Age: 51
End: 2021-05-04

## 2021-05-04 DIAGNOSIS — F33.1 MODERATE EPISODE OF RECURRENT MAJOR DEPRESSIVE DISORDER (HCC): Primary | ICD-10-CM

## 2021-05-04 DIAGNOSIS — F32.1 CURRENT MODERATE EPISODE OF MAJOR DEPRESSIVE DISORDER WITHOUT PRIOR EPISODE (HCC): ICD-10-CM

## 2021-05-04 PROCEDURE — 90791 PSYCH DIAGNOSTIC EVALUATION: CPT | Performed by: COUNSELOR
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
## 2021-05-07 PROBLEM — F33.1 MODERATE EPISODE OF RECURRENT MAJOR DEPRESSIVE DISORDER: Status: ACTIVE | Noted: 2021-05-07

## 2021-05-07 PROBLEM — F32.1 CURRENT MODERATE EPISODE OF MAJOR DEPRESSIVE DISORDER WITHOUT PRIOR EPISODE: Status: ACTIVE | Noted: 2021-05-07

## 2021-05-17 ENCOUNTER — TELEPHONE (OUTPATIENT)
Dept: INTERNAL MEDICINE | Facility: CLINIC | Age: 51
End: 2021-05-17

## 2021-05-17 NOTE — TELEPHONE ENCOUNTER
Caller: Cassia Ochoa    Relationship: Self    Best call back number: [phone]    What medication are you requesting: STEROID    What are your current symptoms: PINCHED NERVE IN NECK    How long have you been experiencing symptoms: 05/10/2021    Have you had these symptoms before:    [x] Yes  [] No    Have you been treated for these symptoms before:   [x] Yes  [] No    If a prescription is needed, what is your preferred pharmacy and phone number: [facility] [address] [phone] [facility] [phone]      Additional notes:  PATIENT STATED SHE HAS A PINCHED NERVE IN HER NECK FOR A WEEK NOW AND IT IS GETTING WORSE. SHE WOULD LIKE A STEROID OR SOMETHING TO TREAT IT IN THE MEANTIME AND ALSO A REFERRAL TO A CHIROPRACTOR THAT ACCEPTS [place] MEDICAID. THE PATIENT ALSO STATED SHE HAD BEEN GIVEN A MUSCLE RELAXER FOR HER BACK, BUT SHE HAD TO STOP TAKING THEM ON 05/13/2021. SHE WOULD LIKE A CALL BACK ASAP TO DISCUSS HER OPTIONS, AND IF AN APPOINTMENT IS NEEDED SHE PREFERS TELEHEALTH VISIT.

## 2021-05-18 ENCOUNTER — TELEMEDICINE (OUTPATIENT)
Dept: INTERNAL MEDICINE | Facility: CLINIC | Age: 51
End: 2021-05-18

## 2021-05-18 DIAGNOSIS — M54.12 CERVICAL RADICULOPATHY: Primary | ICD-10-CM

## 2021-05-18 PROCEDURE — 99213 OFFICE O/P EST LOW 20 MIN: CPT | Performed by: NURSE PRACTITIONER

## 2021-05-18 RX ORDER — PREDNISONE 5 MG/1
1 TABLET ORAL TAKE AS DIRECTED
Qty: 21 EACH | Refills: 0 | Status: SHIPPED | OUTPATIENT
Start: 2021-05-18 | End: 2022-04-08

## 2021-05-18 NOTE — PROGRESS NOTES
Chief Complaint   Patient presents with   • Neck Pain       History of Present Illness      Cassia Ochoa is a 50 y.o. female who presents today for an audio/video visit during the Covid-19 pandemic/federally declared state of public health emergency for neck pain. The use of a video visit has been reviewed with the patient and verbal informed consent has been obtained.       Neck Pain   This is a new problem. Episode onset: 10 days ago. The problem occurs constantly. The problem has been gradually improving. Associated with: Having to work at computer for prolonged periods of time.  The pain is present in the right side. The quality of the pain is described as shooting and stabbing. The pain is at a severity of 3/10 (highest 7/10, lowest 3/10). The symptoms are aggravated by bending, position and twisting. The pain is same all the time. Stiffness is present all day. Associated symptoms include numbness. Pertinent negatives include no chest pain, fever, headaches, leg pain, pain with swallowing, paresis, photophobia, syncope, tingling, trouble swallowing, visual change, weakness or weight loss. Associated symptoms comments: Erythema and warmth of right lateral neck, feeling facial tightness and shooting pain down right back. Having to sleep elevated in bed. Endorses stiffness of neck and difficulty with ROM.. She has tried NSAIDs, acetaminophen, muscle relaxants, home exercises and neck support for the symptoms. The treatment provided no relief.   Has tried using massage gun, biofreeze, stretches. Previously saw chiropractor for similar symptoms who ordered xray imaging showing disc space narrowing at the C5/C6 level with anterior spurring and posterior osteophyte formation present and degenerative lumbar changes.      Review of Systems  Review of Systems   Constitutional: Negative for chills, diaphoresis, fatigue, fever and unexpected weight loss.   HENT: Negative for trouble swallowing.    Eyes: Negative for  photophobia.   Respiratory: Negative for cough and shortness of breath.    Cardiovascular: Negative for chest pain, palpitations and syncope.   Musculoskeletal: Positive for back pain, myalgias, neck pain and neck stiffness. Negative for gait problem.   Skin: Positive for color change.   Neurological: Positive for numbness. Negative for dizziness, tingling, weakness, light-headedness, headache and confusion.         [facility]  The following portions of the patient's history were reviewed and updated as appropriate: allergies, current medications, past family history, past medical history, past social history, past surgical history and problem list.     Past Medical History:   Diagnosis Date   • Bilateral ovarian cysts    • Hashimoto's thyroiditis    • Hypothyroidism    • Urinary tract infection      Social History     Tobacco Use   • Smoking status: Former Smoker     Packs/day: 1.00     Years: 20.00     Pack years: 20.00     Types: Cigarettes     Quit date:      Years since quittin.3   • Smokeless tobacco: Never Used   Substance Use Topics   • Alcohol use: Yes     Comment: rare     Past Surgical History:   Procedure Laterality Date   • APPENDECTOMY     • HYSTERECTOMY       Family History   Problem Relation Age of Onset   • Hypertension Father    • Cancer Brother    • Mental illness Brother    • Thyroid disease Daughter      Allergies   Allergen Reactions   • Sulfa Antibiotics Anaphylaxis   • Codeine Other (See Comments)     Pt states crazy dreams         Current Outpatient Medications:   •  azelastine (ASTELIN) 0.1 % nasal spray, Every 12 (Twelve) Hours., Disp: , Rfl:   •  buPROPion XL (WELLBUTRIN XL) 300 MG 24 hr tablet, Take 1 tablet by mouth Daily., Disp: 30 tablet, Rfl: 5  •  estradiol (ESTRACE) 1 MG tablet, Take 1 tablet by mouth Daily., Disp: 30 tablet, Rfl: 11  •  fluticasone (FLONASE) 50 MCG/ACT nasal spray, fluticasone propionate 50 mcg/actuation nasal spray,suspension  Spray 1 spray every day by  intranasal route for 30 days., Disp: , Rfl:   •  furosemide (LASIX) 20 MG tablet, Take 1 tablet by mouth 2 (Two) Times a Day., Disp: 180 tablet, Rfl: 1  •  levothyroxine (Synthroid) 100 MCG tablet, Take 1 tablet by mouth Daily. On empty stomach, Disp: 30 tablet, Rfl: 5  •  predniSONE 5 MG (21) tablet therapy pack dose pack, Take 1 tablet by mouth Take As Directed. Take as directed on package instructions., Disp: 21 each, Rfl: 0  •  tiZANidine (ZANAFLEX) 2 MG tablet, Take 1 tablet by mouth Every 8 (Eight) Hours As Needed for Muscle Spasms., Disp: 30 tablet, Rfl: 5    Objective   Vitals: Unable to obtain due to audio/video encounter      Physical Exam: Limited due to audio/video encounter  Physical Exam   Constitutional: She is oriented to person, place, and time. She appears well-developed and well-nourished.   HENT:   Head: Normocephalic and atraumatic.   Musculoskeletal:      Cervical back: She exhibits tenderness, pain and spasm. She exhibits no edema and no deformity.   Neurological: She is alert and oriented to person, place, and time.       Result Review :   The following data was reviewed by: JEAN Hawkins on 05/18/2021:  Data reviewed: Radiologic studies September 2020        Assessment and Plan    Diagnoses and all orders for this visit:    1. Cervical radiculopathy (Primary)  -     predniSONE 5 MG (21) tablet therapy pack dose pack; Take 1 tablet by mouth Take As Directed. Take as directed on package instructions.  Dispense: 21 each; Refill: 0  -     Ambulatory Referral to Physical Therapy Evaluate and treat    For acute pain, rest, intermittent application of ice/heat, massage therapy, analgesics and muscle relaxants are recommended. Will trial oral steroids as prescribed for suspected inflammation, hold NSAID's until completion. Will refer to PT given chronic cervical spine changes noted on prior xray imaging September 2020. Recommend follow-up with PCP for re-evaluation and determination of  additional imaging or referral prn if these symptoms worsen or fail to improve as anticipated.      Today I have spent a total of 20 minutes on a telephone encounter with Cassia Ochoa. During this time, a total of 20 minutes was spent in direct discussion with patient regarding pertinent diagnoses, treatment modalities, medications, and follow-up as well as review of prior imaging studies, counseling patient and documenting in the patients medical record. Education includes verbal discussion on the nature of the diagnoses including treatment, complications, implications, and management. Indications for further evaluation and work-up provided.      Follow Up   Return if symptoms worsen or fail to improve.    Plan of care reviewed with patient at conclusion of today's visit. Patient education was provided regarding diagnosis, management, and prescribed or recommended OTC medications. Patient was informed to notify office of any new, worsening, or persistent symptoms. Patient verbalized understanding and agreement with plan of care.     Electronically Signed By:  JEAN Hawikns  05/18/2021    EMR Dragon/Transcription Disclaimer:  Please note that portions of this encounter note were completed using electronic transcription/translation of spoken language to printed text.  The electronic transcription/translation of spoken language may permit erroneous, or at times, nonsensical words or phrases to be inadvertently transcribed.  Although I have reviewed the note for such errors, some may still exist in this documentation.

## 2021-07-06 ENCOUNTER — LAB (OUTPATIENT)
Dept: LAB | Facility: HOSPITAL | Age: 51
End: 2021-07-06

## 2021-07-06 ENCOUNTER — TELEPHONE (OUTPATIENT)
Dept: ENDOCRINOLOGY | Facility: CLINIC | Age: 51
End: 2021-07-06

## 2021-07-06 ENCOUNTER — LAB (OUTPATIENT)
Dept: ENDOCRINOLOGY | Facility: CLINIC | Age: 51
End: 2021-07-06

## 2021-07-06 DIAGNOSIS — E03.9 ACQUIRED HYPOTHYROIDISM: Primary | ICD-10-CM

## 2021-07-06 LAB
T4 FREE SERPL-MCNC: 1.48 NG/DL (ref 0.93–1.7)
TSH SERPL DL<=0.05 MIU/L-ACNC: 0.57 UIU/ML (ref 0.27–4.2)

## 2021-07-06 PROCEDURE — 84443 ASSAY THYROID STIM HORMONE: CPT | Performed by: INTERNAL MEDICINE

## 2021-07-06 PROCEDURE — 84439 ASSAY OF FREE THYROXINE: CPT | Performed by: INTERNAL MEDICINE

## 2021-07-07 ENCOUNTER — OFFICE VISIT (OUTPATIENT)
Dept: ENDOCRINOLOGY | Facility: CLINIC | Age: 51
End: 2021-07-07

## 2021-07-07 VITALS
DIASTOLIC BLOOD PRESSURE: 78 MMHG | HEART RATE: 71 BPM | HEIGHT: 64 IN | OXYGEN SATURATION: 97 % | WEIGHT: 154.8 LBS | SYSTOLIC BLOOD PRESSURE: 132 MMHG | BODY MASS INDEX: 26.43 KG/M2

## 2021-07-07 DIAGNOSIS — E03.9 HYPOTHYROIDISM, UNSPECIFIED TYPE: ICD-10-CM

## 2021-07-07 PROCEDURE — 99213 OFFICE O/P EST LOW 20 MIN: CPT | Performed by: INTERNAL MEDICINE

## 2021-07-07 RX ORDER — LEVOTHYROXINE SODIUM 0.1 MG/1
100 TABLET ORAL DAILY
Qty: 90 TABLET | Refills: 1 | Status: SHIPPED | OUTPATIENT
Start: 2021-07-07 | End: 2022-02-24

## 2021-07-07 NOTE — PROGRESS NOTES
"Chief Complaint   Patient presents with   • Follow-up   • Hypothyroidism        HPI   Mayito Ochoa is a 50 y.o. female had concerns including Follow-up and Hypothyroidism.      Patient reports no significant health changes in the interim since her last visit.  She does report that she changed pharmacies in the interim between visits and was concerned for variation in her thyroid levels as the new pharmacy uses a different generic supplier.  Patient is currently taking levothyroxine 100 mcg daily and denies any missed doses.  She denies significant weight changes, changes in bowel habits, heat or cold intolerance.    The following portions of the patient's history were reviewed and updated as appropriate: allergies, current medications and past social history.    Review of Systems   Constitutional: Negative for fatigue, unexpected weight gain and unexpected weight loss.   Gastrointestinal: Negative for constipation and diarrhea.   Endocrine: Negative for cold intolerance and heat intolerance.        /78   Pulse 71   Ht 162.6 cm (64\")   Wt 70.2 kg (154 lb 12.8 oz)   SpO2 97%   BMI 26.57 kg/m²      Physical Exam      Constitutional:  well developed; well nourished  no acute distress  appears stated age   ENT/Thyroid: not examined   Eyes: Conjunctiva: clear   Respiratory:  breathing is unlabored  clear to auscultation bilaterally   Cardiovascular:  regular rate and rhythm   Chest:  Not performed.   Abdomen: soft, non-tender; no masses   : Not performed.   Musculoskeletal: Not performed   Skin: dry and warm   Neuro: mental status, speech normal   Psych: mood and affect are within normal limits       Labs/Imaging  Results for MAYITO OCHOA (MRN [id number]) as of 7/7/2021 09:29   Ref. Range 7/6/2021 08:54   TSH Baseline Latest Ref Range: 0.270 - 4.200 uIU/mL 0.571   Free T4 Latest Ref Range: 0.93 - 1.70 ng/dL 1.48       Diagnoses and all orders for this visit:    1. Hypothyroidism, unspecified " type  -Currently taking levothyroxine 100 mcg  -Labs completed yesterday were within reference range  -Patient is clinically euthyroid  -Patient will continue current dose of levothyroxine, per her preference we will schedule follow-up in 9 months, she will contact the office in the interim between visits with any concerning changes.   -Lab orders placed so that patient may complete ahead of next appointment.  -     levothyroxine (Synthroid) 100 MCG tablet; Take 1 tablet by mouth Daily. On empty stomach  Dispense: 90 tablet; Refill: 1  -     TSH; Future  -     T4, Free; Future    Return in about 9 months (around 4/7/2022) for Next scheduled follow up. The patient was instructed to contact the clinic with any interval questions or concerns.    Louisa Sharp MD   Endocrinologist    Please note that portions of this document were completed with a voice recognition program. Efforts were made to edit the dictations, but occasionally words are mis-transcribed.

## 2021-11-30 DIAGNOSIS — Z79.890 ENCOUNTER FOR MONITORING ESTROGEN REPLACEMENT THERAPY FOLLOWING SURGICAL MENOPAUSE: ICD-10-CM

## 2021-11-30 DIAGNOSIS — Z12.31 ENCOUNTER FOR SCREENING MAMMOGRAM FOR MALIGNANT NEOPLASM OF BREAST: Primary | ICD-10-CM

## 2021-11-30 DIAGNOSIS — Z51.81 ENCOUNTER FOR MONITORING ESTROGEN REPLACEMENT THERAPY FOLLOWING SURGICAL MENOPAUSE: ICD-10-CM

## 2021-11-30 RX ORDER — ESTRADIOL 1 MG/1
TABLET ORAL
Qty: 30 TABLET | Refills: 0 | Status: SHIPPED | OUTPATIENT
Start: 2021-11-30 | End: 2022-01-26

## 2021-12-01 NOTE — TELEPHONE ENCOUNTER
Spoke to Pt and informed her that she will need to get yearly mammogram done. Pt stated that she is trying to find the paperwork where she went before because it would be free. Pt stated that she will call office to let us know when she has that done.

## 2022-01-26 DIAGNOSIS — Z51.81 ENCOUNTER FOR MONITORING ESTROGEN REPLACEMENT THERAPY FOLLOWING SURGICAL MENOPAUSE: ICD-10-CM

## 2022-01-26 DIAGNOSIS — Z79.890 ENCOUNTER FOR MONITORING ESTROGEN REPLACEMENT THERAPY FOLLOWING SURGICAL MENOPAUSE: ICD-10-CM

## 2022-01-26 RX ORDER — ESTRADIOL 1 MG/1
TABLET ORAL
Qty: 30 TABLET | Refills: 2 | Status: SHIPPED | OUTPATIENT
Start: 2022-01-26 | End: 2022-06-01 | Stop reason: SDUPTHER

## 2022-01-26 NOTE — TELEPHONE ENCOUNTER
LOV telemed 5/18/21 with Maria  No future visit at this time  Last filled 11/30/21 #30 and no refills

## 2022-02-24 DIAGNOSIS — E03.9 HYPOTHYROIDISM, UNSPECIFIED TYPE: ICD-10-CM

## 2022-02-24 RX ORDER — LEVOTHYROXINE SODIUM 0.1 MG/1
TABLET ORAL
Qty: 30 TABLET | Refills: 1 | Status: SHIPPED | OUTPATIENT
Start: 2022-02-24 | End: 2022-04-08 | Stop reason: SDUPTHER

## 2022-04-08 ENCOUNTER — OFFICE VISIT (OUTPATIENT)
Dept: ENDOCRINOLOGY | Facility: CLINIC | Age: 52
End: 2022-04-08

## 2022-04-08 ENCOUNTER — LAB (OUTPATIENT)
Dept: LAB | Facility: HOSPITAL | Age: 52
End: 2022-04-08

## 2022-04-08 VITALS
DIASTOLIC BLOOD PRESSURE: 68 MMHG | OXYGEN SATURATION: 97 % | WEIGHT: 160.2 LBS | HEART RATE: 80 BPM | HEIGHT: 64 IN | BODY MASS INDEX: 27.35 KG/M2 | SYSTOLIC BLOOD PRESSURE: 122 MMHG

## 2022-04-08 DIAGNOSIS — E03.9 HYPOTHYROIDISM, UNSPECIFIED TYPE: Primary | ICD-10-CM

## 2022-04-08 LAB
T4 FREE SERPL-MCNC: 1.87 NG/DL (ref 0.93–1.7)
TSH SERPL DL<=0.05 MIU/L-ACNC: 0.17 UIU/ML (ref 0.27–4.2)

## 2022-04-08 PROCEDURE — 84439 ASSAY OF FREE THYROXINE: CPT | Performed by: INTERNAL MEDICINE

## 2022-04-08 PROCEDURE — 99213 OFFICE O/P EST LOW 20 MIN: CPT | Performed by: INTERNAL MEDICINE

## 2022-04-08 PROCEDURE — 84443 ASSAY THYROID STIM HORMONE: CPT | Performed by: INTERNAL MEDICINE

## 2022-04-08 RX ORDER — BUPROPION HYDROCHLORIDE 300 MG/1
300 TABLET ORAL DAILY
COMMUNITY
Start: 2022-03-24 | End: 2022-06-29

## 2022-04-08 RX ORDER — LEVOTHYROXINE SODIUM 0.1 MG/1
100 TABLET ORAL DAILY
Qty: 90 TABLET | Refills: 1
Start: 2022-04-08 | End: 2022-04-15

## 2022-04-08 NOTE — PROGRESS NOTES
"Chief Complaint   Patient presents with   • Hypothyroidism        HPI   Cassia Ochoa is a 51 y.o. female had concerns including Hypothyroidism.      Patient reports no significant health changes in the interim since her last visit, she is currently taking levothyroxine 100 mcg daily.  She denies any missed doses or concerns regarding this medication.  She reports she generally feels well.  She does report that she is starting to wean herself off HRT, taking estradiol once every 2 to 3 days.    The following portions of the patient's history were reviewed and updated as appropriate: allergies, current medications and past social history.    Review of Systems   Constitutional: Negative for unexpected weight gain and unexpected weight loss.   Cardiovascular: Negative for palpitations.   Gastrointestinal: Negative for constipation and diarrhea.      /68   Pulse 80   Ht 162.6 cm (64\")   Wt 72.7 kg (160 lb 3.2 oz)   SpO2 97%   BMI 27.50 kg/m²      Physical Exam      Constitutional:  well developed; well nourished  no acute distress  appears stated age   ENT/Thyroid: not examined   Eyes: Conjunctiva: clear   Respiratory:  breathing is unlabored  clear to auscultation bilaterally   Cardiovascular:  regular rate and rhythm   Chest:  Not performed.   Abdomen: soft, non-tender; no masses   : Not performed.   Musculoskeletal: Not performed   Skin: not performed.   Neuro: mental status, speech normal   Psych: mood and affect are within normal limits       Labs/Imaging   Latest Reference Range & Units 07/06/21 08:54   TSH Baseline 0.270 - 4.200 uIU/mL 0.571   Free T4 0.93 - 1.70 ng/dL 1.48       Diagnoses and all orders for this visit:    1. Hypothyroidism, unspecified type (Primary)  -Most recent thyroid function testing at goal  -Patient is clinically euthyroid  -Patient currently taking levothyroxine 100 mcg daily  -Plan to update labs today and adjust medications as clinically indicated  -Reviewed symptoms of " thyroid hormone dysregulation, patient instructed to contact the clinic in the interim between visits with any concerning changes.  -     TSH  -     T4, Free       Return in about 1 year (around 4/8/2023). The patient was instructed to contact the clinic with any interval questions or concerns.    Louisa Sharp MD   Endocrinologist    Dictated Utilizing Dragon Dictation

## 2022-04-15 RX ORDER — LEVOTHYROXINE SODIUM 88 UG/1
88 TABLET ORAL DAILY
Qty: 90 TABLET | Refills: 1 | Status: SHIPPED | OUTPATIENT
Start: 2022-04-15 | End: 2022-09-08

## 2022-04-22 DIAGNOSIS — Z51.81 ENCOUNTER FOR MONITORING ESTROGEN REPLACEMENT THERAPY FOLLOWING SURGICAL MENOPAUSE: ICD-10-CM

## 2022-04-22 DIAGNOSIS — Z79.890 ENCOUNTER FOR MONITORING ESTROGEN REPLACEMENT THERAPY FOLLOWING SURGICAL MENOPAUSE: ICD-10-CM

## 2022-04-22 DIAGNOSIS — E03.9 HYPOTHYROIDISM, UNSPECIFIED TYPE: ICD-10-CM

## 2022-04-22 RX ORDER — ESTRADIOL 1 MG/1
TABLET ORAL
Qty: 30 TABLET | Refills: 2 | OUTPATIENT
Start: 2022-04-22

## 2022-04-22 RX ORDER — BUPROPION HYDROCHLORIDE 300 MG/1
TABLET ORAL
Qty: 30 TABLET | Refills: 0 | OUTPATIENT
Start: 2022-04-22

## 2022-04-22 RX ORDER — FUROSEMIDE 20 MG/1
TABLET ORAL
Qty: 60 TABLET | Refills: 1 | OUTPATIENT
Start: 2022-04-22

## 2022-04-25 RX ORDER — LEVOTHYROXINE SODIUM 0.1 MG/1
TABLET ORAL
Qty: 30 TABLET | Refills: 11 | OUTPATIENT
Start: 2022-04-25

## 2022-04-25 NOTE — TELEPHONE ENCOUNTER
It looks like Dr. Sharp wanted to reduce the dose to 88mcg daily after labs reviewed earlier this month-- I think she already sent the new dose.  RT

## 2022-05-23 RX ORDER — FUROSEMIDE 20 MG/1
TABLET ORAL
Qty: 180 TABLET | Refills: 1 | OUTPATIENT
Start: 2022-05-23

## 2022-05-23 RX ORDER — BUPROPION HYDROCHLORIDE 300 MG/1
TABLET ORAL
Qty: 150 TABLET | Refills: 0 | OUTPATIENT
Start: 2022-05-23

## 2022-06-01 ENCOUNTER — OFFICE VISIT (OUTPATIENT)
Dept: INTERNAL MEDICINE | Facility: CLINIC | Age: 52
End: 2022-06-01

## 2022-06-01 VITALS
OXYGEN SATURATION: 99 % | RESPIRATION RATE: 16 BRPM | HEART RATE: 74 BPM | BODY MASS INDEX: 26.98 KG/M2 | SYSTOLIC BLOOD PRESSURE: 130 MMHG | TEMPERATURE: 97.1 F | HEIGHT: 64 IN | WEIGHT: 158 LBS | DIASTOLIC BLOOD PRESSURE: 72 MMHG

## 2022-06-01 DIAGNOSIS — F32.A ANXIETY AND DEPRESSION: Primary | ICD-10-CM

## 2022-06-01 DIAGNOSIS — Z79.890 ENCOUNTER FOR MONITORING ESTROGEN REPLACEMENT THERAPY FOLLOWING SURGICAL MENOPAUSE: ICD-10-CM

## 2022-06-01 DIAGNOSIS — E53.8 B12 DEFICIENCY: ICD-10-CM

## 2022-06-01 DIAGNOSIS — Z51.81 ENCOUNTER FOR MONITORING ESTROGEN REPLACEMENT THERAPY FOLLOWING SURGICAL MENOPAUSE: ICD-10-CM

## 2022-06-01 DIAGNOSIS — F41.9 ANXIETY AND DEPRESSION: Primary | ICD-10-CM

## 2022-06-01 PROCEDURE — 99214 OFFICE O/P EST MOD 30 MIN: CPT | Performed by: NURSE PRACTITIONER

## 2022-06-01 RX ORDER — ESTRADIOL 1 MG/1
1 TABLET ORAL DAILY
Qty: 30 TABLET | Refills: 2 | Status: SHIPPED | OUTPATIENT
Start: 2022-06-01 | End: 2023-01-18 | Stop reason: SDUPTHER

## 2022-06-01 RX ORDER — FLUOXETINE 10 MG/1
10 CAPSULE ORAL DAILY
Qty: 30 CAPSULE | Refills: 0 | Status: SHIPPED | OUTPATIENT
Start: 2022-06-01 | End: 2022-06-29 | Stop reason: SDUPTHER

## 2022-06-01 NOTE — PROGRESS NOTES
Chief Complaint   Patient presents with   • Insomnia     Has not been able to sleep since starting new medication.   • Depression   • Menopause     HRT         History of Present Illness    51 y.o.female presents for insomnia and depression.    Started on depression end of  after her parents passed. Depression chronic.  Started getting bad GERD and having insomnia. Had stopped wellbutrin. GERD got better. Tried going back on wellbutrin and GERD returned; insomnia worse when taking wellbutrin. Has been off wellbutrin again for few days. Would like to switch medication for anxiety depression. Still feels like has some symptoms of anxiety/depression though better.    HRT: Estrogen takes every other night. nonsmoker. No hx dvt. Mammogram dec.     Marginal low B12; c/o fatigue. Would like to try B12 injections.    Review of Systems   Constitutional: Positive for fatigue.   Gastrointestinal: Positive for GERD.   Psychiatric/Behavioral: Positive for sleep disturbance and depressed mood. The patient is nervous/anxious.          [facility]  The following portions of the patient's history were reviewed and updated as appropriate: allergies, current medications, past family history, past medical history, past social history, past surgical history and problem list.     Past Medical History:   Diagnosis Date   • Bilateral ovarian cysts    • Hashimoto's thyroiditis    • Hypothyroidism    • Urinary tract infection       Allergies   Allergen Reactions   • Sulfa Antibiotics Anaphylaxis   • Codeine Other (See Comments)     Pt states crazy dreams      Social History     Tobacco Use   • Smoking status: Former Smoker     Packs/day: 1.00     Years: 20.00     Pack years: 20.00     Types: Cigarettes     Quit date:      Years since quittin.4   • Smokeless tobacco: Never Used   Vaping Use   • Vaping Use: Never used   Substance Use Topics   • Alcohol use: Yes     Comment: rare   • Drug use: No     Past Surgical History:   Procedure  "Laterality Date   • APPENDECTOMY     • HYSTERECTOMY        Family History   Problem Relation Age of Onset   • Hypertension Father    • Cancer Brother    • Mental illness Brother    • Thyroid disease Daughter            Current Outpatient Medications:   •  buPROPion XL (WELLBUTRIN XL) 300 MG 24 hr tablet, Take 300 mg by mouth Daily., Disp: , Rfl:   •  estradiol (ESTRACE) 1 MG tablet, TAKE 1 TABLET BY MOUTH ONCE DAILY, Disp: 30 tablet, Rfl: 2  •  fluticasone (FLONASE) 50 MCG/ACT nasal spray, fluticasone propionate 50 mcg/actuation nasal spray,suspension  Spray 1 spray every day by intranasal route for 30 days., Disp: , Rfl:   •  furosemide (LASIX) 20 MG tablet, Take 1 tablet by mouth 2 (Two) Times a Day., Disp: 180 tablet, Rfl: 1  •  levothyroxine (SYNTHROID, LEVOTHROID) 88 MCG tablet, Take 1 tablet by mouth Daily., Disp: 90 tablet, Rfl: 1    VITALS:  /72   Pulse 74   Temp 97.1 °F (36.2 °C)   Resp 16   Ht 162.6 cm (64.02\")   Wt 71.7 kg (158 lb)   SpO2 99%   BMI 27.11 kg/m²     Physical Exam  Vitals reviewed.   Cardiovascular:      Rate and Rhythm: Normal rate and regular rhythm.      Heart sounds: Normal heart sounds.   Pulmonary:      Effort: Pulmonary effort is normal. No respiratory distress.      Breath sounds: Normal breath sounds.   Neurological:      Mental Status: She is alert and oriented to person, place, and time.   Psychiatric:         Mood and Affect: Mood normal.         Behavior: Behavior normal.         Result Review :            Assessment and Plan    Diagnoses and all orders for this visit:    1. Anxiety and depression (Primary)  -     FLUoxetine (PROzac) 10 MG capsule; Take 1 capsule by mouth Daily.  Dispense: 30 capsule; Refill: 0  Stop wellbutrin.  Start low dose prozac. Fu in 4 weeks if tolerating ok will increase dose.  2. Encounter for monitoring estrogen replacement therapy following surgical menopause  -     estradiol (ESTRACE) 1 MG tablet; Take 1 tablet by mouth Daily.  " Dispense: 30 tablet; Refill: 2    3. B12 deficiency  -     Hydroxocobalamin Acetate 1000 MCG/ML solution; Inject 1 each into the appropriate muscle as directed by prescriber Every 7 (Seven) Days for 4 doses.  Dispense: 4 mL; Refill: 0        I discussed the patients findings and my recommendations with patient.  Patient was encouraged to keep me informed of any acute changes, lack of improvement, or any new concerning symptoms.  Patient voiced understanding of all instructions and denied further questions.      Follow Up   Return in about 4 weeks (around 6/29/2022), or if symptoms worsen or fail to improve, for with me please..      Electronically signed by:    JEAN Castillo  06/01/2022

## 2022-06-08 ENCOUNTER — PRIOR AUTHORIZATION (OUTPATIENT)
Dept: INTERNAL MEDICINE | Facility: CLINIC | Age: 52
End: 2022-06-08

## 2022-06-23 DIAGNOSIS — F32.A ANXIETY AND DEPRESSION: ICD-10-CM

## 2022-06-23 DIAGNOSIS — F41.9 ANXIETY AND DEPRESSION: ICD-10-CM

## 2022-06-23 RX ORDER — FLUOXETINE 10 MG/1
10 CAPSULE ORAL DAILY
Qty: 30 CAPSULE | Refills: 0 | OUTPATIENT
Start: 2022-06-23

## 2022-06-29 ENCOUNTER — TELEMEDICINE (OUTPATIENT)
Dept: INTERNAL MEDICINE | Facility: CLINIC | Age: 52
End: 2022-06-29

## 2022-06-29 DIAGNOSIS — F32.A ANXIETY AND DEPRESSION: ICD-10-CM

## 2022-06-29 DIAGNOSIS — F41.9 ANXIETY AND DEPRESSION: ICD-10-CM

## 2022-06-29 PROCEDURE — 99213 OFFICE O/P EST LOW 20 MIN: CPT | Performed by: NURSE PRACTITIONER

## 2022-06-29 RX ORDER — FLUOXETINE HYDROCHLORIDE 20 MG/1
20 CAPSULE ORAL DAILY
Qty: 90 CAPSULE | Refills: 1 | Status: SHIPPED | OUTPATIENT
Start: 2022-06-29

## 2022-06-29 NOTE — PROGRESS NOTES
Chief Complaint   Patient presents with   • Anxiety   • Depression     The use of a video visit has been reviewed with the patient and verbal informed consent has been obtained.    History of Present Illness    51 y.o.female presents for anxiety depression follow up  Anxiety depression chronic recurrent. Seen a month ago; was having GERD symptoms which pt related to wellbutrin. Wanted to change med. Stopped wellbutrin. Started on prozac. Here for 4 wk follow up. GERD symptoms better. Feels like prozac helpign anxiety depression would like to increase dose.  No side effects.  Review of Systems   Constitutional: Negative for chills and fever.   Psychiatric/Behavioral: Positive for depressed mood. Negative for self-injury and suicidal ideas. The patient is nervous/anxious.          [facility]  The following portions of the patient's history were reviewed and updated as appropriate: allergies, current medications, past family history, past medical history, past social history, past surgical history and problem list.     Past Medical History:   Diagnosis Date   • Bilateral ovarian cysts    • Hashimoto's thyroiditis    • Hypothyroidism    • Urinary tract infection       Allergies   Allergen Reactions   • Sulfa Antibiotics Anaphylaxis   • Codeine Other (See Comments)     Pt states crazy dreams      Social History     Tobacco Use   • Smoking status: Former Smoker     Packs/day: 1.00     Years: 20.00     Pack years: 20.00     Types: Cigarettes     Quit date: 2016     Years since quittin.4   • Smokeless tobacco: Never Used   Vaping Use   • Vaping Use: Never used   Substance Use Topics   • Alcohol use: Yes     Comment: rare   • Drug use: No     Past Surgical History:   Procedure Laterality Date   • APPENDECTOMY     • HYSTERECTOMY        Family History   Problem Relation Age of Onset   • Hypertension Father    • Cancer Brother    • Mental illness Brother    • Thyroid disease Daughter            Current Outpatient Medications:    •  buPROPion XL (WELLBUTRIN XL) 300 MG 24 hr tablet, Take 300 mg by mouth Daily., Disp: , Rfl:   •  estradiol (ESTRACE) 1 MG tablet, Take 1 tablet by mouth Daily., Disp: 30 tablet, Rfl: 2  •  FLUoxetine (PROzac) 10 MG capsule, Take 1 capsule by mouth Daily., Disp: 30 capsule, Rfl: 0  •  fluticasone (FLONASE) 50 MCG/ACT nasal spray, fluticasone propionate 50 mcg/actuation nasal spray,suspension  Spray 1 spray every day by intranasal route for 30 days., Disp: , Rfl:   •  furosemide (LASIX) 20 MG tablet, Take 1 tablet by mouth 2 (Two) Times a Day., Disp: 180 tablet, Rfl: 1  •  levothyroxine (SYNTHROID, LEVOTHROID) 88 MCG tablet, Take 1 tablet by mouth Daily., Disp: 90 tablet, Rfl: 1      Physical Exam  Pulmonary:      Effort: Pulmonary effort is normal. No respiratory distress.   Neurological:      General: No focal deficit present.      Mental Status: She is alert and oriented to person, place, and time.   Psychiatric:         Mood and Affect: Mood normal.         Behavior: Behavior normal.         Result Review :            Assessment and Plan {CC Problem List  Visit Diagnosis  ROS  Review (Popup)  Health Maintenance  Quality  BestPractice  Medications  SmartSets  SnapShot Encounters  Media :23}   Diagnoses and all orders for this visit:    1. Anxiety and depression  -     FLUoxetine (PROzac) 20 MG capsule; Take 1 capsule by mouth Daily.  Dispense: 90 capsule; Refill: 1    increased dose. Improving.    I discussed the patients findings and my recommendations with patient.  Patient was encouraged to keep me informed of any acute changes, lack of improvement, or any new concerning symptoms.  Patient voiced understanding of all instructions and denied further questions.      Follow Up   Return if symptoms worsen or fail to improve.      Electronically signed by:    JEAN Castillo  06/29/2022

## 2022-09-08 DIAGNOSIS — E03.9 HYPOTHYROIDISM, UNSPECIFIED TYPE: ICD-10-CM

## 2022-09-08 RX ORDER — LEVOTHYROXINE SODIUM 88 UG/1
TABLET ORAL
Qty: 90 TABLET | Refills: 2 | Status: SHIPPED | OUTPATIENT
Start: 2022-09-08

## 2022-09-09 ENCOUNTER — LAB (OUTPATIENT)
Dept: LAB | Facility: HOSPITAL | Age: 52
End: 2022-09-09

## 2022-09-09 ENCOUNTER — OFFICE VISIT (OUTPATIENT)
Dept: INTERNAL MEDICINE | Facility: CLINIC | Age: 52
End: 2022-09-09

## 2022-09-09 VITALS
DIASTOLIC BLOOD PRESSURE: 80 MMHG | BODY MASS INDEX: 26.46 KG/M2 | HEIGHT: 64 IN | RESPIRATION RATE: 16 BRPM | TEMPERATURE: 98.1 F | HEART RATE: 57 BPM | WEIGHT: 155 LBS | OXYGEN SATURATION: 98 % | SYSTOLIC BLOOD PRESSURE: 126 MMHG

## 2022-09-09 DIAGNOSIS — Z00.00 HEALTHCARE MAINTENANCE: Primary | ICD-10-CM

## 2022-09-09 DIAGNOSIS — Z00.00 HEALTHCARE MAINTENANCE: ICD-10-CM

## 2022-09-09 DIAGNOSIS — E03.9 HYPOTHYROIDISM, UNSPECIFIED TYPE: ICD-10-CM

## 2022-09-09 DIAGNOSIS — Z12.31 ENCOUNTER FOR SCREENING MAMMOGRAM FOR MALIGNANT NEOPLASM OF BREAST: Primary | ICD-10-CM

## 2022-09-09 DIAGNOSIS — F33.1 MODERATE EPISODE OF RECURRENT MAJOR DEPRESSIVE DISORDER: ICD-10-CM

## 2022-09-09 LAB
T4 FREE SERPL-MCNC: 1.34 NG/DL (ref 0.93–1.7)
TSH SERPL DL<=0.05 MIU/L-ACNC: 3.9 UIU/ML (ref 0.27–4.2)

## 2022-09-09 PROCEDURE — 80053 COMPREHEN METABOLIC PANEL: CPT

## 2022-09-09 PROCEDURE — 84439 ASSAY OF FREE THYROXINE: CPT | Performed by: INTERNAL MEDICINE

## 2022-09-09 PROCEDURE — 83036 HEMOGLOBIN GLYCOSYLATED A1C: CPT

## 2022-09-09 PROCEDURE — 99213 OFFICE O/P EST LOW 20 MIN: CPT | Performed by: NURSE PRACTITIONER

## 2022-09-09 PROCEDURE — 80061 LIPID PANEL: CPT

## 2022-09-09 PROCEDURE — 84443 ASSAY THYROID STIM HORMONE: CPT | Performed by: INTERNAL MEDICINE

## 2022-09-09 PROCEDURE — 36415 COLL VENOUS BLD VENIPUNCTURE: CPT

## 2022-09-09 PROCEDURE — 85027 COMPLETE CBC AUTOMATED: CPT

## 2022-09-09 NOTE — PROGRESS NOTES
New Patient Office Visit      Date: 2022   Patient Name: Cassia Ochoa  : 1970   MRN: [id number]     Chief Complaint:    Chief Complaint   Patient presents with   • Establish Care     Pt to discuss anxiety/depression, did have dog bite x 4 days   • Anxiety   • Depression       History of Present Illness: Cassia Ochoa is a 52 y.o. female who is here today to establish care. She is unsure the date of her last health maintenance examination. She has had a complete hystorectomy. She follows Dr Sharp of endocrinology for thyroid disease. Other chronic medical conditions include allergies and depression/ anxiety- all of which are well controlled on current regimen. She will let me know if ever they are not. She was bitten by her own dog 4 days ago. The dog is UTD on immunizations. She has been on top of wound care with dressing changes and application of topical abx ointment. Otherwise, she has no acute complaints at this time and does not need any medication refills.     Subjective      Review of Systems:   Review of Systems   Constitutional: Negative for chills, fatigue and fever.   HENT: Negative for postnasal drip.    Respiratory: Negative for cough, shortness of breath and wheezing.    Cardiovascular: Negative for chest pain.   Gastrointestinal: Negative for abdominal pain, diarrhea, nausea and vomiting.   Endocrine: Negative for polydipsia, polyphagia and polyuria.   Genitourinary: Negative for dysuria.   Musculoskeletal: Negative for arthralgias and myalgias.   Skin: Negative for rash and bruise.   Neurological: Negative for headache.   Hematological: Negative for adenopathy. Does not bruise/bleed easily.   Psychiatric/Behavioral: Negative for depressed mood.       Past Medical History:   Past Medical History:   Diagnosis Date   • Bilateral ovarian cysts    • Hashimoto's thyroiditis    • Hypothyroidism    • Urinary tract infection        Past Surgical History:   Past Surgical History:  "  Procedure Laterality Date   • APPENDECTOMY     • HYSTERECTOMY         Family History:   Family History   Problem Relation Age of Onset   • Hypertension Father    • Cancer Brother    • Mental illness Brother    • Thyroid disease Daughter        Social History:   Social History     Socioeconomic History   • Marital status: Single   Tobacco Use   • Smoking status: Former Smoker     Packs/day: 1.00     Years: 20.00     Pack years: 20.00     Types: Cigarettes     Quit date:      Years since quittin.6   • Smokeless tobacco: Never Used   Vaping Use   • Vaping Use: Never used   Substance and Sexual Activity   • Alcohol use: Yes     Comment: rare   • Drug use: No   • Sexual activity: Yes     Partners: Male       Medications:     Current Outpatient Medications:   •  estradiol (ESTRACE) 1 MG tablet, Take 1 tablet by mouth Daily., Disp: 30 tablet, Rfl: 2  •  FLUoxetine (PROzac) 20 MG capsule, Take 1 capsule by mouth Daily., Disp: 90 capsule, Rfl: 1  •  fluticasone (FLONASE) 50 MCG/ACT nasal spray, fluticasone propionate 50 mcg/actuation nasal spray,suspension  Spray 1 spray every day by intranasal route for 30 days., Disp: , Rfl:   •  furosemide (LASIX) 20 MG tablet, Take 1 tablet by mouth 2 (Two) Times a Day., Disp: 180 tablet, Rfl: 1  •  levothyroxine (SYNTHROID, LEVOTHROID) 88 MCG tablet, TAKE 1 TABLET BY MOUTH ONCE DAILY, Disp: 90 tablet, Rfl: 2    Allergies:   Allergies   Allergen Reactions   • Sulfa Antibiotics Anaphylaxis   • Wellbutrin [Bupropion] GI Intolerance   • Codeine Other (See Comments)     Pt states crazy dreams       Objective     Physical Exam:  Vital Signs:   Vitals:    22 0945   BP: 126/80   Pulse: 57   Resp: 16   Temp: 98.1 °F (36.7 °C)   SpO2: 98%   Weight: 70.3 kg (155 lb)   Height: 162.6 cm (64\")   PainSc:   2     Body mass index is 26.61 kg/m².         Physical Exam  Vitals and nursing note reviewed.   Constitutional:       Appearance: Normal appearance. She is normal weight.   HENT:    "   Head: Normocephalic and atraumatic.      Right Ear: Tympanic membrane, ear canal and external ear normal.      Left Ear: Tympanic membrane, ear canal and external ear normal.      Nose: Nose normal.      Mouth/Throat:      Mouth: Mucous membranes are moist.      Pharynx: Oropharynx is clear. No oropharyngeal exudate or posterior oropharyngeal erythema.   Eyes:      Pupils: Pupils are equal, round, and reactive to light.   Cardiovascular:      Rate and Rhythm: Normal rate and regular rhythm.      Heart sounds: Normal heart sounds.   Pulmonary:      Effort: Pulmonary effort is normal. No respiratory distress.      Breath sounds: Normal breath sounds.   Musculoskeletal:      Cervical back: Neck supple.      Right lower leg: No edema.      Left lower leg: No edema.   Lymphadenopathy:      Cervical: No cervical adenopathy.   Skin:     General: Skin is warm and dry.      Capillary Refill: Capillary refill takes less than 2 seconds.      Findings: Wound (three well healing puncture wound on left forearm without edema, erythema, or swelling ) present.   Neurological:      General: No focal deficit present.      Mental Status: She is alert and oriented to person, place, and time. Mental status is at baseline.   Psychiatric:         Mood and Affect: Mood normal.         Behavior: Behavior normal.         Thought Content: Thought content normal.         Judgment: Judgment normal.         Procedures    Results:     Labs: reviewed     Imaging:     Assessment / Plan      Assessment/Plan:   Diagnoses and all orders for this visit:    1. Healthcare maintenance (Primary)  -     CBC (No Diff); Future  -     Comprehensive Metabolic Panel; Future  -     Hemoglobin A1c; Future  -     Lipid Panel; Future  -I strongly encouraged her to participate in lung cancer screening and pap. She declines.  -I will order repeat screening MMG to be performed on or after 11-8-22. She gets screenings through program at [facility] Per pt,  colonoscopy performed 2 years ago      2. Depression  -She will let me know if current medication regimen is effective          Follow Up:   Return in about 6 months (around 3/9/2023) for Recheck.    JEAN oByd  [facility] [place]

## 2022-09-10 LAB
ALBUMIN SERPL-MCNC: 4.6 G/DL (ref 3.5–5.2)
ALBUMIN/GLOB SERPL: 1.7 G/DL
ALP SERPL-CCNC: 62 U/L (ref 39–117)
ALT SERPL W P-5'-P-CCNC: 27 U/L (ref 1–33)
ANION GAP SERPL CALCULATED.3IONS-SCNC: 10.7 MMOL/L (ref 5–15)
AST SERPL-CCNC: 27 U/L (ref 1–32)
BILIRUB SERPL-MCNC: 0.5 MG/DL (ref 0–1.2)
BUN SERPL-MCNC: 13 MG/DL (ref 6–20)
BUN/CREAT SERPL: 14.8 (ref 7–25)
CALCIUM SPEC-SCNC: 9.8 MG/DL (ref 8.6–10.5)
CHLORIDE SERPL-SCNC: 103 MMOL/L (ref 98–107)
CHOLEST SERPL-MCNC: 249 MG/DL (ref 0–200)
CO2 SERPL-SCNC: 26.3 MMOL/L (ref 22–29)
CREAT SERPL-MCNC: 0.88 MG/DL (ref 0.57–1)
DEPRECATED RDW RBC AUTO: 43.6 FL (ref 37–54)
EGFRCR SERPLBLD CKD-EPI 2021: 78.2 ML/MIN/1.73
ERYTHROCYTE [DISTWIDTH] IN BLOOD BY AUTOMATED COUNT: 13 % (ref 12.3–15.4)
GLOBULIN UR ELPH-MCNC: 2.7 GM/DL
GLUCOSE SERPL-MCNC: 88 MG/DL (ref 65–99)
HBA1C MFR BLD: 5.5 % (ref 4.8–5.6)
HCT VFR BLD AUTO: 42.7 % (ref 34–46.6)
HDLC SERPL-MCNC: 71 MG/DL (ref 40–60)
HGB BLD-MCNC: 14.3 G/DL (ref 12–15.9)
LDLC SERPL CALC-MCNC: 157 MG/DL (ref 0–100)
LDLC/HDLC SERPL: 2.17 {RATIO}
MCH RBC QN AUTO: 31 PG (ref 26.6–33)
MCHC RBC AUTO-ENTMCNC: 33.5 G/DL (ref 31.5–35.7)
MCV RBC AUTO: 92.4 FL (ref 79–97)
PLATELET # BLD AUTO: 257 10*3/MM3 (ref 140–450)
PMV BLD AUTO: 10.9 FL (ref 6–12)
POTASSIUM SERPL-SCNC: 4.2 MMOL/L (ref 3.5–5.2)
PROT SERPL-MCNC: 7.3 G/DL (ref 6–8.5)
RBC # BLD AUTO: 4.62 10*6/MM3 (ref 3.77–5.28)
SODIUM SERPL-SCNC: 140 MMOL/L (ref 136–145)
TRIGL SERPL-MCNC: 120 MG/DL (ref 0–150)
VLDLC SERPL-MCNC: 21 MG/DL (ref 5–40)
WBC NRBC COR # BLD: 5.94 10*3/MM3 (ref 3.4–10.8)

## 2022-11-21 RX ORDER — FUROSEMIDE 20 MG/1
TABLET ORAL
Qty: 180 TABLET | Refills: 1 | Status: SHIPPED | OUTPATIENT
Start: 2022-11-21

## 2023-01-18 DIAGNOSIS — Z79.890 ENCOUNTER FOR MONITORING ESTROGEN REPLACEMENT THERAPY FOLLOWING SURGICAL MENOPAUSE: ICD-10-CM

## 2023-01-18 DIAGNOSIS — Z51.81 ENCOUNTER FOR MONITORING ESTROGEN REPLACEMENT THERAPY FOLLOWING SURGICAL MENOPAUSE: ICD-10-CM

## 2023-01-18 RX ORDER — ESTRADIOL 1 MG/1
1 TABLET ORAL DAILY
Qty: 30 TABLET | Refills: 2 | Status: SHIPPED | OUTPATIENT
Start: 2023-01-18

## 2023-01-18 NOTE — TELEPHONE ENCOUNTER
Caller: Farhat Ochoaina    Relationship: Self    Best call back number: [phone]    Requested Prescriptions:   Requested Prescriptions     Pending Prescriptions Disp Refills   • estradiol (ESTRACE) 1 MG tablet 30 tablet 2     Sig: Take 1 tablet by mouth Daily.        Pharmacy where request should be sent: [facility] - [place], [place] - [address] - [phone]  - [phone] FX         Does the patient have less than a 3 day supply:  [x] Yes  [] No    Would you like a call back once the refill request has been completed: [] Yes [x] No    If the office needs to give you a call back, can they leave a voicemail: [] Yes [x] No    Ruddy Leija, PCT   01/18/23 15:57 EST

## 2023-01-30 ENCOUNTER — PATIENT MESSAGE (OUTPATIENT)
Dept: INTERNAL MEDICINE | Facility: CLINIC | Age: 53
End: 2023-01-30
Payer: MEDICAID

## 2023-05-10 DIAGNOSIS — E03.9 HYPOTHYROIDISM, UNSPECIFIED TYPE: ICD-10-CM

## 2023-05-10 RX ORDER — LEVOTHYROXINE SODIUM 88 UG/1
TABLET ORAL
Qty: 30 TABLET | Refills: 0 | Status: SHIPPED | OUTPATIENT
Start: 2023-05-10

## 2023-05-10 NOTE — TELEPHONE ENCOUNTER
Rx Refill Note    Requested Prescriptions     Pending Prescriptions Disp Refills   • levothyroxine (SYNTHROID, LEVOTHROID) 88 MCG tablet [Pharmacy Med Name: LEVOTHYROXINE SODIUM 88MCG TABLET] 90 tablet 2     Sig: TAKE 1 TABLET BY MOUTH ONCE DAILY        Last office visit with prescribing clinician: 4/8/2022       Next office visit with prescribing clinician: Visit date not found     {    Ade Leigh MA  05/10/23, 13:20 EDT

## 2023-06-14 DIAGNOSIS — E03.9 HYPOTHYROIDISM, UNSPECIFIED TYPE: ICD-10-CM

## 2023-06-14 RX ORDER — LEVOTHYROXINE SODIUM 88 UG/1
TABLET ORAL
Qty: 30 TABLET | Refills: 0 | OUTPATIENT
Start: 2023-06-14

## 2023-06-14 NOTE — TELEPHONE ENCOUNTER
Rx Refill Note    Requested Prescriptions     Pending Prescriptions Disp Refills    levothyroxine (SYNTHROID, LEVOTHROID) 88 MCG tablet [Pharmacy Med Name: LEVOTHYROXINE SODIUM 88MCG TABLET] 30 tablet 0     Sig: TAKE 1 TABLET BY MOUTH ONCE DAILY        Last office visit with prescribing clinician: 4/8/2022       Next office visit with prescribing clinician: Visit date not found     {    Ade Leigh MA  06/14/23, 15:42 EDT

## 2023-06-17 DIAGNOSIS — E03.9 HYPOTHYROIDISM, UNSPECIFIED TYPE: ICD-10-CM

## 2023-06-19 RX ORDER — LEVOTHYROXINE SODIUM 88 UG/1
TABLET ORAL
Qty: 30 TABLET | Refills: 0 | OUTPATIENT
Start: 2023-06-19

## 2023-06-19 NOTE — TELEPHONE ENCOUNTER
Rx Refill Note    Requested Prescriptions     Pending Prescriptions Disp Refills    levothyroxine (SYNTHROID, LEVOTHROID) 88 MCG tablet [Pharmacy Med Name: LEVOTHYROXINE SODIUM 88MCG TABLET] 30 tablet 0     Sig: TAKE 1 TABLET BY MOUTH ONCE DAILY        Last office visit with prescribing clinician: 4/8/2022       Next office visit with prescribing clinician: Visit date not found     {    Ade Leigh MA  06/19/23, 08:35 EDT

## 2023-09-20 DIAGNOSIS — Z51.81 ENCOUNTER FOR MONITORING ESTROGEN REPLACEMENT THERAPY FOLLOWING SURGICAL MENOPAUSE: ICD-10-CM

## 2023-09-20 DIAGNOSIS — Z79.890 ENCOUNTER FOR MONITORING ESTROGEN REPLACEMENT THERAPY FOLLOWING SURGICAL MENOPAUSE: ICD-10-CM

## 2023-09-20 RX ORDER — ESTRADIOL 1 MG/1
1 TABLET ORAL DAILY
Qty: 30 TABLET | Refills: 2 | OUTPATIENT
Start: 2023-09-20

## 2024-06-14 DIAGNOSIS — E03.9 HYPOTHYROIDISM, UNSPECIFIED TYPE: ICD-10-CM

## 2024-06-14 RX ORDER — LEVOTHYROXINE SODIUM 88 UG/1
88 TABLET ORAL DAILY
Qty: 90 TABLET | Refills: 1 | Status: SHIPPED | OUTPATIENT
Start: 2024-06-14

## 2024-06-14 NOTE — TELEPHONE ENCOUNTER
Caller: Gabriela Cassia    Relationship: Self    Best call back number: [phone]    Requested Prescriptions:  levothyroxine (SYNTHROID, LEVOTHROID) 88 MCG tablet    Requested Prescriptions      No prescriptions requested or ordered in this encounter        Pharmacy where request should be sent:  [facility] - [place], [place] - [address] - [phone] PH - [phone] FX  [address], [place] [address]  Phone: [phone]  Fax: [phone]       Last office visit with prescribing clinician: 6/28/2023   Last telemedicine visit with prescribing clinician: Visit date not found   Next office visit with prescribing clinician: 10/18/2024     Additional details provided by patient: PATIENT IS TO SEE DR. CUELLO ON 10/18. PT ASKED IF SHE WOULD NEED LABS BEFORE THEN    Does the patient have less than a 3 day supply:  [x] Yes  [] No    Would you like a call back once the refill request has been completed: [x] Yes [] No    If the office needs to give you a call back, can they leave a voicemail: [x] Yes [] No    Madelyn Napoles Rep   06/14/24 13:33 EDT         
90-day supply was sent.  Patient can have labs completed at her appointment.    
Scrip is pending.  
Warm

## 2024-10-18 ENCOUNTER — OFFICE VISIT (OUTPATIENT)
Dept: ENDOCRINOLOGY | Facility: CLINIC | Age: 54
End: 2024-10-18
Payer: COMMERCIAL

## 2024-10-18 VITALS
DIASTOLIC BLOOD PRESSURE: 70 MMHG | HEART RATE: 81 BPM | HEIGHT: 64 IN | WEIGHT: 175 LBS | SYSTOLIC BLOOD PRESSURE: 130 MMHG | BODY MASS INDEX: 29.88 KG/M2

## 2024-10-18 DIAGNOSIS — E03.9 HYPOTHYROIDISM, UNSPECIFIED TYPE: Primary | ICD-10-CM

## 2024-10-18 DIAGNOSIS — E66.9 OBESITY (BMI 30-39.9): ICD-10-CM

## 2024-10-18 LAB — HBA1C MFR BLD: 5.2 % (ref 4.8–5.6)

## 2024-10-18 PROCEDURE — 84443 ASSAY THYROID STIM HORMONE: CPT | Performed by: INTERNAL MEDICINE

## 2024-10-18 PROCEDURE — 83036 HEMOGLOBIN GLYCOSYLATED A1C: CPT | Performed by: INTERNAL MEDICINE

## 2024-10-18 PROCEDURE — 84439 ASSAY OF FREE THYROXINE: CPT | Performed by: INTERNAL MEDICINE

## 2024-10-18 RX ORDER — ESTRADIOL 1.53 MG/1
1 SPRAY TRANSDERMAL DAILY
COMMUNITY
Start: 2024-08-15

## 2024-10-18 RX ORDER — LANOLIN ALCOHOL/MO/W.PET/CERES
5000 CREAM (GRAM) TOPICAL DAILY
COMMUNITY

## 2024-10-18 RX ORDER — ESTRADIOL 1 MG/1
1 TABLET ORAL DAILY
COMMUNITY

## 2024-10-18 RX ORDER — VITAMIN B COMPLEX
1 CAPSULE ORAL DAILY
COMMUNITY

## 2024-10-18 RX ORDER — AZELASTINE 1 MG/ML
2 SPRAY, METERED NASAL 2 TIMES DAILY
COMMUNITY

## 2024-10-18 NOTE — PROGRESS NOTES
"Chief Complaint   Patient presents with    Hypothyroidism        HPI   Cassia Ochoa is a 54 y.o. female had concerns including Hypothyroidism.      Patient presents for follow-up of hypothyroidism.  Last seen in June 2023.    Patient is currently taking levothyroxine 88 mcg daily.  She denies missed doses.  She reports somewhat at her workplace asked her at what point she may want to start T3 containing therapy.  Patient does report significant anxiety.  Patient also reports that another provider checked a marker concerning for prediabetes which was abnormal.  She is unsure what lab this was.  She has had recent weight gain.  She reports a diet low in carbohydrates.  She does not exercise regularly.    The following portions of the patient's history were reviewed and updated as appropriate: allergies, current medications, and past social history.    Review of Systems   Constitutional:  Positive for unexpected weight gain.      /70   Pulse 81   Ht 162.6 cm (64\")   Wt 79.4 kg (175 lb)   BMI 30.04 kg/m²      Physical Exam      Constitutional:  well developed; well nourished  no acute distress  obese - Body mass index is 30.04 kg/m².   ENT/Thyroid: no thyromegaly   Eyes: Conjunctiva: clear   Respiratory:  breathing is unlabored  clear to auscultation bilaterally   Cardiovascular:  regular rate and rhythm   Chest:  Not performed.   Abdomen: Not performed.   : Not performed.   Musculoskeletal: Not performed   Skin: not performed.   Neuro: mental status, speech normal   Psych: mood and affect are within normal limits       Labs/Imaging   Latest Reference Range & Units 12/31/20 09:51 07/06/21 08:54 04/08/22 10:24 09/09/22 10:47 06/28/23 15:33   TSH Baseline 0.270 - 4.200 uIU/mL 0.528 0.571 0.175 (L) 3.900 2.320   Free T4 0.93 - 1.70 ng/dL 1.73 (H) 1.48 1.87 (H) 1.34 1.79 (H)   (L): Data is abnormally low  (H): Data is abnormally high    Diagnoses and all orders for this visit:    1. Hypothyroidism, " unspecified type (Primary)  -     TSH; Future  -     T4, Free; Future  Patient is currently taking levothyroxine 88 mcg daily  Discussed potential risks and benefits of T3 containing therapy.  Following discussion, patient stated she would not want to initiate due to fear of worsening anxiety and potential cardiovascular risk.  Update labs today and adjust medication as clinically indicated    2. Obesity (BMI 30-39.9)  -     Hemoglobin A1c; Future  BMI 30.  Discussed correlation between obesity and insulin resistance and how this impacts glycemic control.  Patient reports the provider checked a marker that was concerning for prediabetes, it is unsure what lab was checked.  Check hemoglobin A1c today.         Return in about 1 year (around 10/18/2025). The patient was instructed to contact the clinic with any interval questions or concerns.    Electronically signed by: Louisa Sharp MD   Endocrinologist    Dictated Utilizing Dragon Dictation

## 2024-10-19 LAB
T4 FREE SERPL-MCNC: 1.15 NG/DL (ref 0.92–1.68)
TSH SERPL DL<=0.05 MIU/L-ACNC: 9.59 UIU/ML (ref 0.27–4.2)

## 2024-10-28 ENCOUNTER — TELEPHONE (OUTPATIENT)
Dept: ENDOCRINOLOGY | Facility: CLINIC | Age: 54
End: 2024-10-28
Payer: COMMERCIAL

## 2024-10-28 NOTE — TELEPHONE ENCOUNTER
PT CALLED BACK REQUESTING WE REACH OUT TO HER TO CONSULT ON RECENT LABS. SHE REQUESTED WE LEAVE HER VM ABOUT RESULTS IF WE DO NOT REACH HER.

## 2025-03-18 ENCOUNTER — OFFICE VISIT (OUTPATIENT)
Dept: ENDOCRINOLOGY | Facility: CLINIC | Age: 55
End: 2025-03-18
Payer: COMMERCIAL

## 2025-03-18 VITALS
BODY MASS INDEX: 30.9 KG/M2 | HEART RATE: 67 BPM | DIASTOLIC BLOOD PRESSURE: 76 MMHG | OXYGEN SATURATION: 94 % | SYSTOLIC BLOOD PRESSURE: 128 MMHG | HEIGHT: 64 IN | WEIGHT: 181 LBS

## 2025-03-18 DIAGNOSIS — E88.819 INSULIN RESISTANCE: ICD-10-CM

## 2025-03-18 DIAGNOSIS — E66.811 CLASS 1 OBESITY WITH BODY MASS INDEX (BMI) OF 31.0 TO 31.9 IN ADULT, UNSPECIFIED OBESITY TYPE, UNSPECIFIED WHETHER SERIOUS COMORBIDITY PRESENT: ICD-10-CM

## 2025-03-18 DIAGNOSIS — E03.9 HYPOTHYROIDISM, UNSPECIFIED TYPE: Primary | ICD-10-CM

## 2025-03-18 DIAGNOSIS — E28.39 ESTROGEN DEFICIENCY: ICD-10-CM

## 2025-03-18 LAB
T4 FREE SERPL-MCNC: 1.65 NG/DL (ref 0.92–1.68)
TSH SERPL DL<=0.05 MIU/L-ACNC: 0.89 UIU/ML (ref 0.27–4.2)

## 2025-03-18 PROCEDURE — 84439 ASSAY OF FREE THYROXINE: CPT | Performed by: INTERNAL MEDICINE

## 2025-03-18 PROCEDURE — 84443 ASSAY THYROID STIM HORMONE: CPT | Performed by: INTERNAL MEDICINE

## 2025-03-18 NOTE — PROGRESS NOTES
"Chief Complaint   Patient presents with    Hypothyroidism        HPI   Cassia Ochoa is a 54 y.o. female had concerns including Hypothyroidism.      Patient presents for follow-up.  She is currently taking levothyroxine 100 mcg daily.  This was increased last visit. She does report difficulty with anxiety and is in the process of being evaluated to potentially start medication for this. She is in the process of establishing care with a new PCP.  She also reports frustration regarding inability to lose weight.    She reports concern regarding possible insulin resistance.  She reports she is concern for this based on some symptoms she has been experiencing such as tachycardia.  She does report that a previous provider had mentioned prediabetes.  She is not currently on any medication to alter glycemic control.    Patient reports she is interested in discussing options for estrogen replacement. She was previously on estradiol but then was transition to a topical spray which she is no longer using.  She estimates she has gained 40 pounds since discontinuing estrogen.    The following portions of the patient's history were reviewed and updated as appropriate: allergies, current medications, and past social history.    Review of Systems   Constitutional:  Positive for unexpected weight gain.     /76   Pulse 67   Ht 162.6 cm (64.02\")   Wt 82.1 kg (181 lb)   SpO2 94%   BMI 31.05 kg/m²      Physical Exam      Constitutional:  well developed; well nourished  no acute distress  appears stated age   ENT/Thyroid: not examined   Eyes: Conjunctiva: clear   Respiratory:  breathing is unlabored  clear to auscultation bilaterally   Cardiovascular:  regular rate and rhythm   Chest:  Not performed.   Abdomen: Not performed.   : Not performed.   Musculoskeletal: Not performed   Skin: not performed.   Neuro: mental status, speech normal   Psych: mood and affect are within normal limits       Labs/Imaging   Latest " Reference Range & Units 10/18/24 15:42   Hemoglobin A1C 4.80 - 5.60 % 5.20   TSH Baseline 0.270 - 4.200 uIU/mL 9.590 (H)   Free T4 0.92 - 1.68 ng/dL 1.15   (H): Data is abnormally high    Diagnoses and all orders for this visit:    1. Hypothyroidism, unspecified type (Primary)  -     TSH; Future  -     T4, Free; Future  Patient is currently taking levothyroxine 100 mcg daily.  Patient reports concern for inability to lose weight.  She is otherwise clinically euthyroid.  Plan to update labs today as dose was adjusted last visit.  Adjust medication as clinically indicated after review of labs.    2. Insulin resistance  3.Class 1 obesity with body mass index (BMI) of 31.0 to 31.9 in adult, unspecified obesity type, unspecified whether serious comorbidity present   Patient reports concern for insulin resistance based on symptoms.  She also reports she has been told she may have prediabetes in the past.  Discussed that available hemoglobin A1c's have been normal.  We did discuss the correlation between obesity and insulin resistance.  Reviewed factors impacting glycemic control.  Patient is making efforts to lose weight.  We did discuss the calorie deficit required for weight loss.  We also discussed utilization of metformin for insulin resistance.  We also discussed option of referral to weight management clinic, if desired.    3. Estrogen deficiency  Patient reports she is interested in resuming HRT as she believes she is gained approximately 40 pounds since discontinuation.  I discussed with patient that I do not generally manage postmenopausal hormone replacement and defer to GYN.  I offered GYN referral.  Patient reports she will discuss this with her PCP.       Return in about 6 months (around 9/18/2025). The patient was instructed to contact the clinic with any interval questions or concerns.    Electronically signed by: Louisa Sharp MD   Endocrinologist    Dictated Utilizing Dragon Dictation